# Patient Record
Sex: FEMALE | Race: WHITE | Employment: UNEMPLOYED | URBAN - METROPOLITAN AREA
[De-identification: names, ages, dates, MRNs, and addresses within clinical notes are randomized per-mention and may not be internally consistent; named-entity substitution may affect disease eponyms.]

---

## 2017-03-13 ENCOUNTER — HOSPITAL ENCOUNTER (OUTPATIENT)
Dept: OTHER | Age: 52
Discharge: HOME | End: 2017-03-13
Attending: PSYCHIATRY & NEUROLOGY | Admitting: PSYCHIATRY & NEUROLOGY

## 2017-03-13 LAB
A/G RATIO: 1.6 (ref 1.1–2.2)
ALBUMIN SERPL-MCNC: 4.2 G/DL (ref 3.4–5)
ALP BLD-CCNC: 43 U/L (ref 40–129)
ALT SERPL-CCNC: 25 U/L (ref 10–40)
ANION GAP SERPL CALCULATED.3IONS-SCNC: 13 MMOL/L (ref 3–16)
AST SERPL-CCNC: 24 U/L (ref 15–37)
BASOPHILS ABSOLUTE: 0 K/UL (ref 0–0.2)
BASOPHILS RELATIVE PERCENT: 0.7 %
BILIRUB SERPL-MCNC: <0.2 MG/DL (ref 0–1)
BUN BLDV-MCNC: 14 MG/DL (ref 7–20)
CALCIUM SERPL-MCNC: 9 MG/DL (ref 8.3–10.6)
CHLORIDE BLD-SCNC: 104 MMOL/L (ref 99–110)
CO2: 24 MMOL/L (ref 21–32)
CREAT SERPL-MCNC: 0.7 MG/DL (ref 0.6–1.1)
EOSINOPHILS ABSOLUTE: 0.1 K/UL (ref 0–0.6)
EOSINOPHILS RELATIVE PERCENT: 1 %
GFR AFRICAN AMERICAN: >60
GFR NON-AFRICAN AMERICAN: >60
GLOBULIN: 2.7 G/DL
GLUCOSE BLD-MCNC: 95 MG/DL (ref 70–99)
HCT VFR BLD CALC: 41.3 % (ref 36–48)
HEMOGLOBIN: 13.4 G/DL (ref 12–16)
LYMPHOCYTES ABSOLUTE: 2.2 K/UL (ref 1–5.1)
LYMPHOCYTES RELATIVE PERCENT: 37.5 %
MCH RBC QN AUTO: 28.5 PG (ref 26–34)
MCHC RBC AUTO-ENTMCNC: 32.3 G/DL (ref 31–36)
MCV RBC AUTO: 88.3 FL (ref 80–100)
MONOCYTES ABSOLUTE: 0.5 K/UL (ref 0–1.3)
MONOCYTES RELATIVE PERCENT: 8.5 %
NEUTROPHILS ABSOLUTE: 3.1 K/UL (ref 1.7–7.7)
NEUTROPHILS RELATIVE PERCENT: 52.3 %
PDW BLD-RTO: 13.5 % (ref 12.4–15.4)
PLATELET # BLD: 225 K/UL (ref 135–450)
PMV BLD AUTO: 9 FL (ref 5–10.5)
POTASSIUM SERPL-SCNC: 3.6 MMOL/L (ref 3.5–5.1)
RBC # BLD: 4.68 M/UL (ref 4–5.2)
SODIUM BLD-SCNC: 141 MMOL/L (ref 136–145)
TOTAL PROTEIN: 6.9 G/DL (ref 6.4–8.2)
WBC # BLD: 6 K/UL (ref 4–11)

## 2017-03-15 ENCOUNTER — HOSPITAL ENCOUNTER (OUTPATIENT)
Dept: MAMMOGRAPHY | Age: 52
Discharge: HOME | End: 2017-03-15
Attending: OBSTETRICS & GYNECOLOGY | Admitting: OBSTETRICS & GYNECOLOGY

## 2017-03-15 DIAGNOSIS — Z12.31 VISIT FOR SCREENING MAMMOGRAM: ICD-10-CM

## 2017-03-31 ENCOUNTER — OFFICE VISIT (OUTPATIENT)
Dept: CARDIOLOGY CLINIC | Age: 52
End: 2017-03-31

## 2017-03-31 VITALS
WEIGHT: 145 LBS | SYSTOLIC BLOOD PRESSURE: 110 MMHG | HEART RATE: 58 BPM | OXYGEN SATURATION: 96 % | DIASTOLIC BLOOD PRESSURE: 70 MMHG | HEIGHT: 62 IN | BODY MASS INDEX: 26.68 KG/M2

## 2017-03-31 DIAGNOSIS — R07.2 PRECORDIAL PAIN: ICD-10-CM

## 2017-03-31 PROCEDURE — 99204 OFFICE O/P NEW MOD 45 MIN: CPT | Performed by: INTERNAL MEDICINE

## 2017-03-31 RX ORDER — MORPHINE SULFATE 60 MG/1
60 CAPSULE, EXTENDED RELEASE ORAL DAILY
COMMUNITY

## 2017-03-31 RX ORDER — LEVALBUTEROL INHALATION SOLUTION 0.31 MG/3ML
1 SOLUTION RESPIRATORY (INHALATION) EVERY 4 HOURS PRN
COMMUNITY
End: 2018-01-03 | Stop reason: SDUPTHER

## 2017-03-31 RX ORDER — MORPHINE SULFATE 15 MG/1
30 TABLET, FILM COATED, EXTENDED RELEASE ORAL
Refills: 0 | COMMUNITY
Start: 2017-03-06 | End: 2018-02-28 | Stop reason: ALTCHOICE

## 2017-03-31 RX ORDER — AZITHROMYCIN 250 MG/1
250 TABLET, FILM COATED ORAL DAILY
COMMUNITY
End: 2017-06-22 | Stop reason: ALTCHOICE

## 2017-04-11 ENCOUNTER — HOSPITAL ENCOUNTER (OUTPATIENT)
Dept: CARDIOLOGY | Facility: CLINIC | Age: 52
Discharge: HOME | End: 2017-04-11
Attending: INTERNAL MEDICINE | Admitting: INTERNAL MEDICINE

## 2017-04-11 LAB
LV EF: 75 %
LVEF MODALITY: NORMAL

## 2017-04-12 ENCOUNTER — TELEPHONE (OUTPATIENT)
Dept: CARDIOLOGY CLINIC | Age: 52
End: 2017-04-12

## 2017-04-12 DIAGNOSIS — S22.42XA CLOSED FRACTURE OF MULTIPLE RIBS OF LEFT SIDE, INITIAL ENCOUNTER: Primary | ICD-10-CM

## 2017-04-21 ENCOUNTER — HOSPITAL ENCOUNTER (OUTPATIENT)
Dept: CT IMAGING | Age: 52
Discharge: HOME | End: 2017-04-21
Admitting: INTERNAL MEDICINE

## 2017-04-21 ENCOUNTER — TELEPHONE (OUTPATIENT)
Dept: CARDIOLOGY CLINIC | Age: 52
End: 2017-04-21

## 2017-04-21 DIAGNOSIS — S22.42XA CLOSED FRACTURE OF MULTIPLE RIBS OF LEFT SIDE, INITIAL ENCOUNTER: ICD-10-CM

## 2017-04-21 DIAGNOSIS — S22.42XA MULTIPLE FRACTURES OF RIBS, LEFT SIDE, INITIAL ENCOUNTER FOR CLOSED FRACTURE: ICD-10-CM

## 2017-04-21 DIAGNOSIS — R07.2 PRECORDIAL PAIN: ICD-10-CM

## 2017-04-21 LAB
CHOLESTEROL, TOTAL: 227 MG/DL (ref 0–199)
HDLC SERPL-MCNC: 86 MG/DL (ref 40–60)
LDL CHOLESTEROL CALCULATED: 109 MG/DL
TRIGL SERPL-MCNC: 158 MG/DL (ref 0–150)
TSH REFLEX FT4: 1.07 UIU/ML (ref 0.27–4.2)
VLDLC SERPL CALC-MCNC: 32 MG/DL

## 2017-04-24 ENCOUNTER — TELEPHONE (OUTPATIENT)
Dept: CARDIOLOGY CLINIC | Age: 52
End: 2017-04-24

## 2017-06-22 ENCOUNTER — HOSPITAL ENCOUNTER (OUTPATIENT)
Dept: GENERAL RADIOLOGY | Age: 52
Discharge: HOME | End: 2017-06-22

## 2017-06-22 ENCOUNTER — TELEPHONE (OUTPATIENT)
Dept: PULMONOLOGY | Age: 52
End: 2017-06-22

## 2017-06-22 ENCOUNTER — OFFICE VISIT (OUTPATIENT)
Dept: PULMONOLOGY | Age: 52
End: 2017-06-22

## 2017-06-22 VITALS
BODY MASS INDEX: 26.39 KG/M2 | TEMPERATURE: 98.2 F | WEIGHT: 143.4 LBS | OXYGEN SATURATION: 99 % | HEIGHT: 62 IN | SYSTOLIC BLOOD PRESSURE: 107 MMHG | DIASTOLIC BLOOD PRESSURE: 62 MMHG | HEART RATE: 54 BPM | RESPIRATION RATE: 16 BRPM

## 2017-06-22 DIAGNOSIS — D71 GRANULOMATOUS DISEASE (HCC): ICD-10-CM

## 2017-06-22 DIAGNOSIS — J45.50 ASTHMA, SEVERE PERSISTENT, POORLY-CONTROLLED, UNCOMPLICATED: ICD-10-CM

## 2017-06-22 DIAGNOSIS — J45.50 ASTHMA, SEVERE PERSISTENT, POORLY-CONTROLLED, UNCOMPLICATED: Primary | ICD-10-CM

## 2017-06-22 LAB
BASOPHILS ABSOLUTE: 0 K/UL (ref 0–0.2)
BASOPHILS RELATIVE PERCENT: 1.2 %
EOSINOPHILS ABSOLUTE: 0.1 K/UL (ref 0–0.6)
EOSINOPHILS RELATIVE PERCENT: 3.9 %
HCT VFR BLD CALC: 42.8 % (ref 36–48)
HEMOGLOBIN: 13.8 G/DL (ref 12–16)
LYMPHOCYTES ABSOLUTE: 1.4 K/UL (ref 1–5.1)
LYMPHOCYTES RELATIVE PERCENT: 39.9 %
MCH RBC QN AUTO: 29 PG (ref 26–34)
MCHC RBC AUTO-ENTMCNC: 32.2 G/DL (ref 31–36)
MCV RBC AUTO: 89.9 FL (ref 80–100)
MONOCYTES ABSOLUTE: 0.3 K/UL (ref 0–1.3)
MONOCYTES RELATIVE PERCENT: 8.2 %
NEUTROPHILS ABSOLUTE: 1.6 K/UL (ref 1.7–7.7)
NEUTROPHILS RELATIVE PERCENT: 46.8 %
PDW BLD-RTO: 13.4 % (ref 12.4–15.4)
PLATELET # BLD: 189 K/UL (ref 135–450)
PMV BLD AUTO: 10.3 FL (ref 5–10.5)
RBC # BLD: 4.76 M/UL (ref 4–5.2)
WBC # BLD: 3.5 K/UL (ref 4–11)

## 2017-06-22 PROCEDURE — 99204 OFFICE O/P NEW MOD 45 MIN: CPT | Performed by: INTERNAL MEDICINE

## 2017-06-22 RX ORDER — M-VIT,TX,IRON,MINS/CALC/FOLIC 27MG-0.4MG
1 TABLET ORAL DAILY
COMMUNITY

## 2017-06-22 RX ORDER — MONTELUKAST SODIUM 10 MG/1
10 TABLET ORAL NIGHTLY
COMMUNITY
End: 2020-11-15 | Stop reason: SDUPTHER

## 2017-06-22 RX ORDER — MULTIVIT WITH MINERALS/LUTEIN
1000 TABLET ORAL DAILY
COMMUNITY

## 2017-06-22 RX ORDER — OMEPRAZOLE 20 MG/1
20 CAPSULE, DELAYED RELEASE ORAL 2 TIMES DAILY
Refills: 5 | COMMUNITY
Start: 2017-05-30

## 2017-06-22 RX ORDER — CETIRIZINE HYDROCHLORIDE 10 MG/1
10 TABLET ORAL DAILY
COMMUNITY
End: 2022-06-20 | Stop reason: SDUPTHER

## 2017-06-22 ASSESSMENT — ENCOUNTER SYMPTOMS
EYE ITCHING: 0
COUGH: 1
WHEEZING: 1
EYE PAIN: 0
VOICE CHANGE: 0
DIARRHEA: 0
EYE DISCHARGE: 0
CONSTIPATION: 0
ABDOMINAL PAIN: 0
SHORTNESS OF BREATH: 1
SORE THROAT: 0

## 2017-06-23 LAB — ANGIOTENSIN CONVERTING ENZYME: 48 U/L (ref 9–67)

## 2017-06-24 LAB
ALPHA-1 ANTITRYPSIN PHENOTYPE: NORMAL
ALPHA-1 ANTITRYPSIN: 174 MG/DL (ref 90–200)

## 2017-06-25 LAB
2000687N OAK TREE IGE: <0.1 KU/L
ALLERGEN ASPERGILLUS ALTERNATA IGE: <0.1 KU/L
ALLERGEN ASPERGILLUS FUMIGATUS IGE: <0.1 KU/L
ALLERGEN BERMUDA GRASS IGE: <0.1 KU/L
ALLERGEN BIRCH IGE: <0.1 KU/L
ALLERGEN CAT DANDER IGE: <0.1 KU/L
ALLERGEN COMMON SHORT RAGWEED IGE: <0.1 KU/L
ALLERGEN COTTONWOOD: <0.1 KU/L
ALLERGEN COW MILK IGE: <0.1 KU/L
ALLERGEN DOG DANDER IGE: <0.1 KU/L
ALLERGEN ELM IGE: <0.1 KU/L
ALLERGEN FUNGI/MOLD M.RACEMOSUS IGE: <0.1 KU/L
ALLERGEN GERMAN COCKROACH IGE: <0.1 KU/L
ALLERGEN HORMODENDRUM HORDEI IGE: <0.1 KU/L
ALLERGEN MAPLE/BOX ELDER IGE: <0.1 KU/L
ALLERGEN MITE DUST FARINAE IGE: <0.1 KU/L
ALLERGEN MITE DUST PTERONYSSINUS IGE: <0.1 KU/L
ALLERGEN MOUNTAIN CEDAR: <0.1 KU/L
ALLERGEN MOUSE EPITHELIA IGE: <0.1 KU/L
ALLERGEN PEANUT (F13) IGE: <0.1 KU/L
ALLERGEN PECAN TREE IGE: <0.1 KU/L
ALLERGEN PENICILLIUM NOTATUM: <0.1 KU/L
ALLERGEN ROUGH PIGWEED (W14) IGE: <0.1 KU/L
ALLERGEN RUSSIAN THISTLE IGE: <0.1 KU/L
ALLERGEN SHEEP SORREL (W18) IGE: <0.1 KU/L
ALLERGEN TIMOTHY GRASS: <0.1 KU/L
ALLERGEN TREE SYCAMORE: <0.1 KU/L
ALLERGEN WALNUT TREE IGE: <0.1 KU/L
ALLERGEN WHITE MULBERRY TREE, IGE: <0.1 KU/L
ALLERGEN, TREE, WHITE ASH IGE: <0.1 KU/L
IGE: 10 KU/L

## 2017-06-26 LAB — ALLERGEN SEE NOTE: NORMAL

## 2017-06-27 ENCOUNTER — HOSPITAL ENCOUNTER (OUTPATIENT)
Dept: PULMONOLOGY | Age: 52
Discharge: HOME | End: 2017-06-27
Attending: INTERNAL MEDICINE | Admitting: INTERNAL MEDICINE

## 2017-06-27 ENCOUNTER — OFFICE VISIT (OUTPATIENT)
Dept: PULMONOLOGY | Age: 52
End: 2017-06-27

## 2017-06-27 VITALS
RESPIRATION RATE: 16 BRPM | TEMPERATURE: 97.5 F | BODY MASS INDEX: 22.51 KG/M2 | OXYGEN SATURATION: 98 % | HEIGHT: 67 IN | DIASTOLIC BLOOD PRESSURE: 60 MMHG | WEIGHT: 143.4 LBS | HEART RATE: 62 BPM | SYSTOLIC BLOOD PRESSURE: 109 MMHG

## 2017-06-27 DIAGNOSIS — J45.50 ASTHMA, SEVERE PERSISTENT, WELL-CONTROLLED: Primary | ICD-10-CM

## 2017-06-27 DIAGNOSIS — J45.50 ASTHMA, SEVERE PERSISTENT, POORLY-CONTROLLED, UNCOMPLICATED: ICD-10-CM

## 2017-06-27 DIAGNOSIS — J45.50 SEVERE PERSISTENT ASTHMA, UNCOMPLICATED: ICD-10-CM

## 2017-06-27 DIAGNOSIS — D71 GRANULOMATOUS DISEASE (HCC): ICD-10-CM

## 2017-06-27 PROCEDURE — 99213 OFFICE O/P EST LOW 20 MIN: CPT | Performed by: INTERNAL MEDICINE

## 2017-06-27 RX ORDER — LEVALBUTEROL TARTRATE 45 UG/1
4 AEROSOL, METERED ORAL ONCE
Status: COMPLETED | OUTPATIENT
Start: 2017-06-27 | End: 2017-06-27

## 2017-06-27 RX ADMIN — LEVALBUTEROL TARTRATE 4 PUFF: 45 AEROSOL, METERED ORAL at 11:06

## 2017-06-27 ASSESSMENT — ENCOUNTER SYMPTOMS
SORE THROAT: 0
EYE DISCHARGE: 0
CONSTIPATION: 0
SHORTNESS OF BREATH: 1
COUGH: 0
EYE ITCHING: 0
EYE PAIN: 0
DIARRHEA: 0
ABDOMINAL PAIN: 0
CHOKING: 0
VOICE CHANGE: 0

## 2017-06-29 ENCOUNTER — TELEPHONE (OUTPATIENT)
Dept: PULMONOLOGY | Age: 52
End: 2017-06-29

## 2017-07-03 ENCOUNTER — HOSPITAL ENCOUNTER (OUTPATIENT)
Dept: OTHER | Age: 52
Discharge: HOME | End: 2017-07-03
Attending: INTERNAL MEDICINE | Admitting: INTERNAL MEDICINE

## 2017-07-03 ENCOUNTER — OFFICE VISIT (OUTPATIENT)
Dept: PULMONOLOGY | Age: 52
End: 2017-07-03

## 2017-07-03 VITALS
RESPIRATION RATE: 16 BRPM | SYSTOLIC BLOOD PRESSURE: 103 MMHG | HEART RATE: 67 BPM | DIASTOLIC BLOOD PRESSURE: 64 MMHG | OXYGEN SATURATION: 99 % | WEIGHT: 142.2 LBS | BODY MASS INDEX: 22.32 KG/M2 | TEMPERATURE: 98.2 F | HEIGHT: 67 IN

## 2017-07-03 DIAGNOSIS — J45.50 UNCOMPLICATED SEVERE PERSISTENT ASTHMA: ICD-10-CM

## 2017-07-03 DIAGNOSIS — R07.89 OTHER CHEST PAIN: ICD-10-CM

## 2017-07-03 DIAGNOSIS — R07.89 OTHER CHEST PAIN: Primary | ICD-10-CM

## 2017-07-03 PROCEDURE — 99214 OFFICE O/P EST MOD 30 MIN: CPT | Performed by: INTERNAL MEDICINE

## 2017-07-03 RX ORDER — PREDNISONE 20 MG/1
60 TABLET ORAL DAILY
Qty: 15 TABLET | Refills: 0 | Status: SHIPPED | OUTPATIENT
Start: 2017-07-03 | End: 2017-07-08

## 2017-07-03 RX ORDER — BUDESONIDE AND FORMOTEROL FUMARATE DIHYDRATE 160; 4.5 UG/1; UG/1
2 AEROSOL RESPIRATORY (INHALATION) 2 TIMES DAILY
Qty: 1 INHALER | Refills: 3 | Status: SHIPPED | OUTPATIENT
Start: 2017-07-03 | End: 2017-09-27 | Stop reason: ALTCHOICE

## 2017-07-03 ASSESSMENT — ENCOUNTER SYMPTOMS
COUGH: 0
EYE PAIN: 0
SHORTNESS OF BREATH: 1
EYE ITCHING: 0
ABDOMINAL PAIN: 0
CONSTIPATION: 0
SORE THROAT: 0
DIARRHEA: 0
CHEST TIGHTNESS: 1
VOICE CHANGE: 0
EYE DISCHARGE: 0

## 2017-07-07 ENCOUNTER — TELEPHONE (OUTPATIENT)
Dept: PULMONOLOGY | Age: 52
End: 2017-07-07

## 2017-07-07 DIAGNOSIS — R07.89 OTHER CHEST PAIN: Primary | ICD-10-CM

## 2017-07-20 ENCOUNTER — HOSPITAL ENCOUNTER (OUTPATIENT)
Dept: CT IMAGING | Age: 52
Discharge: HOME | End: 2017-07-20
Attending: INTERNAL MEDICINE | Admitting: INTERNAL MEDICINE

## 2017-07-20 DIAGNOSIS — R07.89 OTHER CHEST PAIN: ICD-10-CM

## 2017-09-27 ENCOUNTER — OFFICE VISIT (OUTPATIENT)
Dept: PULMONOLOGY | Age: 52
End: 2017-09-27

## 2017-09-27 VITALS
TEMPERATURE: 98.1 F | HEART RATE: 63 BPM | DIASTOLIC BLOOD PRESSURE: 69 MMHG | RESPIRATION RATE: 16 BRPM | OXYGEN SATURATION: 97 % | WEIGHT: 148.4 LBS | HEIGHT: 67 IN | SYSTOLIC BLOOD PRESSURE: 107 MMHG | BODY MASS INDEX: 23.29 KG/M2

## 2017-09-27 DIAGNOSIS — J44.9 COPD WITH ASTHMA (HCC): ICD-10-CM

## 2017-09-27 DIAGNOSIS — G47.10 EXCESSIVE SOMNOLENCE DISORDER: Primary | ICD-10-CM

## 2017-09-27 PROCEDURE — 99214 OFFICE O/P EST MOD 30 MIN: CPT | Performed by: INTERNAL MEDICINE

## 2017-09-27 ASSESSMENT — SLEEP AND FATIGUE QUESTIONNAIRES
ESS TOTAL SCORE: 13
HOW LIKELY ARE YOU TO NOD OFF OR FALL ASLEEP WHILE SITTING QUIETLY AFTER LUNCH WITHOUT ALCOHOL: 3
NECK CIRCUMFERENCE (INCHES): 12.5
HOW LIKELY ARE YOU TO NOD OFF OR FALL ASLEEP WHILE LYING DOWN TO REST IN THE AFTERNOON WHEN CIRCUMSTANCES PERMIT: 3
HOW LIKELY ARE YOU TO NOD OFF OR FALL ASLEEP WHILE SITTING INACTIVE IN A PUBLIC PLACE: 1
HOW LIKELY ARE YOU TO NOD OFF OR FALL ASLEEP WHILE WATCHING TV: 1
HOW LIKELY ARE YOU TO NOD OFF OR FALL ASLEEP WHILE SITTING AND TALKING TO SOMEONE: 0
HOW LIKELY ARE YOU TO NOD OFF OR FALL ASLEEP IN A CAR, WHILE STOPPED FOR A FEW MINUTES IN TRAFFIC: 0
HOW LIKELY ARE YOU TO NOD OFF OR FALL ASLEEP WHILE SITTING AND READING: 3
HOW LIKELY ARE YOU TO NOD OFF OR FALL ASLEEP WHEN YOU ARE A PASSENGER IN A CAR FOR AN HOUR WITHOUT A BREAK: 2

## 2017-09-27 ASSESSMENT — ENCOUNTER SYMPTOMS
WHEEZING: 0
EYE ITCHING: 0
ABDOMINAL PAIN: 0
VOICE CHANGE: 0
EYE DISCHARGE: 0
EYE PAIN: 0
COUGH: 1
CONSTIPATION: 0
SORE THROAT: 0
DIARRHEA: 0
SHORTNESS OF BREATH: 0

## 2017-10-24 ENCOUNTER — OFFICE VISIT (OUTPATIENT)
Dept: PULMONOLOGY | Age: 52
End: 2017-10-24

## 2017-10-24 VITALS
HEART RATE: 62 BPM | TEMPERATURE: 97.8 F | WEIGHT: 151 LBS | OXYGEN SATURATION: 97 % | BODY MASS INDEX: 23.7 KG/M2 | HEIGHT: 67 IN | DIASTOLIC BLOOD PRESSURE: 64 MMHG | SYSTOLIC BLOOD PRESSURE: 106 MMHG | RESPIRATION RATE: 16 BRPM

## 2017-10-24 DIAGNOSIS — Z87.891 FORMER SMOKER: ICD-10-CM

## 2017-10-24 DIAGNOSIS — G47.33 OSA (OBSTRUCTIVE SLEEP APNEA): ICD-10-CM

## 2017-10-24 DIAGNOSIS — J45.21 MILD INTERMITTENT ASTHMA WITH ACUTE EXACERBATION: Primary | ICD-10-CM

## 2017-10-24 DIAGNOSIS — J43.8 PARASEPTAL EMPHYSEMA (HCC): ICD-10-CM

## 2017-10-24 DIAGNOSIS — B37.0 ORAL THRUSH: ICD-10-CM

## 2017-10-24 DIAGNOSIS — J20.9 ACUTE BRONCHITIS, UNSPECIFIED ORGANISM: ICD-10-CM

## 2017-10-24 DIAGNOSIS — J43.9 BULLA OF LUNG (HCC): ICD-10-CM

## 2017-10-24 PROCEDURE — G8427 DOCREV CUR MEDS BY ELIG CLIN: HCPCS | Performed by: INTERNAL MEDICINE

## 2017-10-24 PROCEDURE — 1036F TOBACCO NON-USER: CPT | Performed by: INTERNAL MEDICINE

## 2017-10-24 PROCEDURE — 99213 OFFICE O/P EST LOW 20 MIN: CPT | Performed by: INTERNAL MEDICINE

## 2017-10-24 PROCEDURE — G8926 SPIRO NO PERF OR DOC: HCPCS | Performed by: INTERNAL MEDICINE

## 2017-10-24 PROCEDURE — 3014F SCREEN MAMMO DOC REV: CPT | Performed by: INTERNAL MEDICINE

## 2017-10-24 PROCEDURE — 3017F COLORECTAL CA SCREEN DOC REV: CPT | Performed by: INTERNAL MEDICINE

## 2017-10-24 PROCEDURE — 3023F SPIROM DOC REV: CPT | Performed by: INTERNAL MEDICINE

## 2017-10-24 PROCEDURE — G8420 CALC BMI NORM PARAMETERS: HCPCS | Performed by: INTERNAL MEDICINE

## 2017-10-24 PROCEDURE — G8484 FLU IMMUNIZE NO ADMIN: HCPCS | Performed by: INTERNAL MEDICINE

## 2017-10-24 RX ORDER — AZITHROMYCIN 250 MG/1
TABLET, FILM COATED ORAL
Qty: 1 PACKET | Refills: 0 | Status: SHIPPED | OUTPATIENT
Start: 2017-10-24 | End: 2017-11-03

## 2017-10-24 RX ORDER — PREDNISONE 10 MG/1
10 TABLET ORAL DAILY
Qty: 15 TABLET | Refills: 0 | Status: SHIPPED | OUTPATIENT
Start: 2017-10-24 | End: 2017-10-29

## 2017-10-24 RX ORDER — FLUCONAZOLE 100 MG/1
100 TABLET ORAL DAILY
Qty: 5 TABLET | Refills: 5 | Status: SHIPPED | OUTPATIENT
Start: 2017-10-24 | End: 2017-10-29

## 2017-10-24 ASSESSMENT — ENCOUNTER SYMPTOMS
WHEEZING: 1
SINUS PRESSURE: 1
SHORTNESS OF BREATH: 1
SORE THROAT: 1
RHINORRHEA: 1
VOICE CHANGE: 1

## 2017-10-24 NOTE — LETTER
[address] [facility]  [address] [place] [address]  Phone: [phone]  Fax: [phone]    Moises Hidalgo MD        October 24, 2017       Patient: Venita Weber   MR Number: [id number]   YOB: 1965   Date of Visit: 10/24/2017       Dear Dr. Roxana Alvarado:    Mary Otero was seen today for Dr. Joann Rowell. She has an asthma exacerbation with acute bronchitis. Below are the relevant portions of my assessment and plan of care. She will follow up with Dr. Joann Rowell on 11/28/17. If you have questions, please do not hesitate to call me. I look forward to following Mildred Gutierrez along with you.     Sincerely,        Duong Larson MD    CC providers:  Katy Coughlin MD  [address] [place] [address] Drive: [phone]

## 2017-10-24 NOTE — PROGRESS NOTES
Pulmonary Outpatient Note   Diana Nugent MD       10/24/2017    1. Mild intermittent asthma with acute exacerbation    2. Acute bronchitis, unspecified organism    3. Oral thrush    4. Bulla of lung ([address]    5. Paraseptal emphysema ([address]    6. VADIM (obstructive sleep apnea)    7. Former smoker          ASSESSMENT/PLAN:  1. Mild intermittent asthma with acute exacerbation. Continue Dulera, she was given a 5 day burst of prednisone 30 mg daily. 2.  Acute bronchitis. Typically patients with asthma did not need an antibiotic for exacerbation, but she continues to have yellowish phlegm and is not improved. I will give her Z-Jeffrey. She has had \"severe allergy\" to sulfa as a child, has had significant skin rashes with penicillin. 3.  Oral thrush. The areas too far back for nystatin to work, she was given 5 days of Diflucan. She was told to use her spacer with her Sultan. 4.  Bullous lung, paraseptal emphysema. The lesion in the left lower lobe is certainly a bulla, as it has clearly defined walls, is unchanged from 2 scans. I do not believe this is an area of air trapping. Images were shown to her. She also has a few blebs at the apex, minimal paraseptal emphysema. She has quit smoking, does not have any secondhand smoke exposure. Alpha-1 antitrypsin level was normal.  I told her the bulla and is to be left alone unless it enlarges and compresses the left lung. 5.  VADIM. A sleep study and follow-up has been scheduled by Dr. Munira Rob. 6.  Former smoker. No lung nodules on recent CT. Consider bone imaging for osteoporosis if clinically indicated. 7.  Prophylaxis. Has received a flu shot. [facility] 11/28/17 for follow-up with Dr. Munira Rob. Call next week if not improved. No orders of the defined types were placed in this encounter. Return in about 5 weeks (around 11/28/2017).       Chief Complaint:  Illness (chest congestion x 1 week)       HPI:   47y.o. year old female here for increasing chest congestion. She is a prior smoker with bronchial asthma. Her PCP is Cristela Phipps. Ngoc Giraldo is a 79-year-old female being seen for my partner Dr. Amalia Davis. She was last seen by him on 9/27/17. She is being followed for asthma, was well controlled on Dulera and when necessary Xopenex. At that visit she had described snoring, strong family history and had daytime somnolence. She had been asked to continue her bronchodilator regimen, and was asked to get evaluated for VADIM. About 1-1.5 weeks ago, she developed an upper respiratory tract infection involving her upper airway, and simultaneously had chest infection. She had cough with phlegm that is dark yellowish. She denied hemoptysis. She was feverish, but did not have a thermometer, when she did buy a thermometer, her fever was gone. She had lost her voice, and this is slightly improved, she missed one day of work last week. Her boyfriend got sick after her, there was no recent travel or other sick contacts. Her boyfriend does not smoke. She also had increased dyspnea, and was using her rescue inhaler very frequently, up to 3 times a day. She has a spacer that she uses on her albuterol, but not on Dulera. She was compliant with the [facility]. She denied GI or  complaints. The patient gives a history of smoking up to 1.5 pack per day, but for less than 10 years intermittently. She has not smoked for more than 15 years. I reviewed her CT chest and her PFT. Her last PFT on 6/27/17 showed FEV1 of 1.34 L, 44% predicted, with a good bronchodilator response. TLC was reduced at 77% predicted, there was evidence of air trapping. There was a significant decline in her TLC from 6/29/10.     Past Medical History:   Diagnosis Date    Asthma     Back pain     Headache     Neck pain        Past Surgical History:   Procedure Laterality Date    FOOT SURGERY      right    HIP SURGERY      HYSTERECTOMY      NECK SURGERY      NECK SURGERY Social History   Substance Use Topics    Smoking status: Former Smoker     Packs/day: 0.50     Years: 10.00     Types: Cigarettes     Quit date: 1/1/2007    Smokeless tobacco: Never Used    Alcohol use Not on file       Family History   Problem Relation Age of Onset    High Blood Pressure Father     Heart Disease Father      aorta ruptured    Heart Disease Maternal Grandmother      pacemaker         Current Outpatient Prescriptions:     Chlorphen-Phenyleph-ASA (KATARINA-SELTZER PLUS COLD PO), Take by mouth as needed, Disp: , Rfl:     predniSONE (DELTASONE) 10 MG tablet, Take 1 tablet by mouth daily for 5 days, Disp: 15 tablet, Rfl: 0    azithromycin (ZITHROMAX) 250 MG tablet, 2 on day 1, then 1 daily until gone, Disp: 1 packet, Rfl: 0    fluconazole (DIFLUCAN) 100 MG tablet, Take 1 tablet by mouth daily for 5 days, Disp: 5 tablet, Rfl: 5    mometasone-formoterol (DULERA) 200-5 MCG/ACT inhaler, Inhale 2 puffs into the lungs every 12 hours, Disp: 1 Inhaler, Rfl: 11    omeprazole (PRILOSEC) 20 MG delayed release capsule, , Disp: , Rfl: 5    vitamin B-2 (RIBOFLAVIN) 100 MG TABS tablet, , Disp: , Rfl: 3    cetirizine (ZYRTEC) 10 MG tablet, Take 10 mg by mouth daily, Disp: , Rfl:     naloxegol (MOVANTIK) 25 MG TABS tablet, Take 25 mg by mouth every morning, Disp: , Rfl:     Multiple Vitamins-Minerals (THERAPEUTIC MULTIVITAMIN-MINERALS) tablet, Take 1 tablet by mouth daily, Disp: , Rfl:     Ascorbic Acid (VITAMIN C) 250 MG tablet, Take 1,000 mg by mouth daily, Disp: , Rfl:     estrogens, conjugated, (PREMARIN) 1.25 MG tablet, Take by mouth, Disp: , Rfl:     morphine (AVINZA) 60 MG extended release capsule, Take 60 mg by mouth ., Disp: , Rfl:     morphine (MS CONTIN) 15 MG extended release tablet, 30 mg  ., Disp: , Rfl: 0    Levalbuterol HCl (XOPENEX IN), Inhale into the lungs, Disp: , Rfl:     levalbuterol (XOPENEX) 0.31 MG/3ML nebulization, Take 1 ampule by nebulization every 4 hours as needed for Wheezing, Disp: , Rfl:     SUMAtriptan (IMITREX) 100 MG tablet, Take 200 mg by mouth once as needed. , Disp: , Rfl:     SUMAtriptan (IMITREX) 5 MG/ACT nasal spray, 1 spray by Nasal route daily as needed. , Disp: , Rfl:     quetiapine (SEROQUEL) 100 MG tablet, Take 50 mg by mouth nightly , Disp: , Rfl:     topiramate (TOPAMAX) 100 MG tablet, Take 200 mg by mouth daily. , Disp: , Rfl:     montelukast (SINGULAIR) 10 MG tablet, Take 10 mg by mouth nightly, Disp: , Rfl:     ibuprofen (IBU) 600 MG tablet, Take 1 tablet by mouth every 6 hours as needed for Pain. Take with food. (Patient taking differently: Take 800 mg by mouth every 6 hours as needed for Pain Take with food.), Disp: 20 tablet, Rfl: 0    Latex; Acyclovir; Codeine; Eggs [egg white]; Penicillins; and Sulfa antibiotics    Vitals:    10/24/17 1404   BP: 106/64   Site: Left Arm   Position: Sitting   Cuff Size: Medium Adult   Pulse: 62   Resp: 16   Temp: 97.8 °F (36.6 °C)   TempSrc: Oral   SpO2: 97%   Weight: 151 lb (68.5 kg)   Height: 5' 7\" (1.702 m)       Review of Systems   HENT: Positive for congestion, postnasal drip, rhinorrhea, sinus pressure, sore throat and voice change. Respiratory: Positive for shortness of breath and wheezing. Psychiatric/Behavioral: Positive for sleep disturbance. All other systems reviewed and are negative. Physical Exam   Constitutional: She is oriented to person, place, and time. She appears well-developed and well-nourished. No distress. Thin built, ill appearing, congested sounding voice   HENT:   Head: Normocephalic and atraumatic. Nose: Nose normal.   Mouth/Throat: No oropharyngeal exudate. Class II airway, thrush in both tonsillar pillars, above the tonsils   Eyes: Conjunctivae are normal. Pupils are equal, round, and reactive to light. Right eye exhibits no discharge. Left eye exhibits discharge. No scleral icterus. Neck: No JVD present. No tracheal deviation present.    Cardiovascular: Normal rate, regular rhythm and normal heart sounds. Exam reveals no gallop. No murmur heard. Pulmonary/Chest: Effort normal. No stridor. No respiratory distress. She has wheezes (Few scattered). She has no rales. Neurological: She is alert and oriented to person, place, and time. Skin: Skin is warm and dry. No rash noted. She is not diaphoretic. No erythema. No pallor. Psychiatric: She has a normal mood and affect. Her behavior is normal.   Vitals reviewed.

## 2017-10-31 ENCOUNTER — HOSPITAL ENCOUNTER (OUTPATIENT)
Dept: GENERAL RADIOLOGY | Age: 52
Discharge: HOME | End: 2017-10-31
Attending: INTERNAL MEDICINE | Admitting: INTERNAL MEDICINE

## 2017-10-31 ENCOUNTER — OFFICE VISIT (OUTPATIENT)
Dept: PULMONOLOGY | Age: 52
End: 2017-10-31

## 2017-10-31 VITALS
TEMPERATURE: 97.8 F | HEART RATE: 63 BPM | DIASTOLIC BLOOD PRESSURE: 65 MMHG | HEIGHT: 67 IN | WEIGHT: 147 LBS | RESPIRATION RATE: 16 BRPM | SYSTOLIC BLOOD PRESSURE: 114 MMHG | OXYGEN SATURATION: 98 % | BODY MASS INDEX: 23.07 KG/M2

## 2017-10-31 DIAGNOSIS — J01.90 ACUTE SINUSITIS WITH SYMPTOMS GREATER THAN 10 DAYS: ICD-10-CM

## 2017-10-31 DIAGNOSIS — J22 ACUTE RESPIRATORY INFECTION: ICD-10-CM

## 2017-10-31 DIAGNOSIS — J22 ACUTE RESPIRATORY INFECTION: Primary | ICD-10-CM

## 2017-10-31 DIAGNOSIS — J45.51 ALLERGIC ASTHMA, SEVERE PERSISTENT, WITH ACUTE EXACERBATION: ICD-10-CM

## 2017-10-31 PROCEDURE — 3017F COLORECTAL CA SCREEN DOC REV: CPT | Performed by: INTERNAL MEDICINE

## 2017-10-31 PROCEDURE — 1036F TOBACCO NON-USER: CPT | Performed by: INTERNAL MEDICINE

## 2017-10-31 PROCEDURE — 99214 OFFICE O/P EST MOD 30 MIN: CPT | Performed by: INTERNAL MEDICINE

## 2017-10-31 PROCEDURE — G8427 DOCREV CUR MEDS BY ELIG CLIN: HCPCS | Performed by: INTERNAL MEDICINE

## 2017-10-31 PROCEDURE — G8420 CALC BMI NORM PARAMETERS: HCPCS | Performed by: INTERNAL MEDICINE

## 2017-10-31 PROCEDURE — G8484 FLU IMMUNIZE NO ADMIN: HCPCS | Performed by: INTERNAL MEDICINE

## 2017-10-31 PROCEDURE — 3014F SCREEN MAMMO DOC REV: CPT | Performed by: INTERNAL MEDICINE

## 2017-10-31 RX ORDER — DOXYCYCLINE HYCLATE 100 MG
100 TABLET ORAL 2 TIMES DAILY
Qty: 20 TABLET | Refills: 0 | Status: SHIPPED | OUTPATIENT
Start: 2017-10-31 | End: 2017-11-10

## 2017-10-31 ASSESSMENT — ENCOUNTER SYMPTOMS
COUGH: 1
SINUS PRESSURE: 1
SINUS PAIN: 1
ABDOMINAL PAIN: 0
EYE ITCHING: 0
SORE THROAT: 1
VOICE CHANGE: 0
EYE PAIN: 0
DIARRHEA: 0
CONSTIPATION: 0
EYE DISCHARGE: 0
WHEEZING: 0
SHORTNESS OF BREATH: 0

## 2017-10-31 NOTE — PROGRESS NOTES
Pulmonary Outpatient Note   Radha Walton MD       10/31/2017    Chief Complaint:  Cough; Chest Congestion; and Ear Fullness     HPI:   47y.o. year old female here for further evaluation of ongoing congestion symptoms. For over the last 2 weeks she was experiencing congestion in her sinuses and chest, shortness of breath, and yellow sputum. Seen last week and started on steroids as well as low dose azithromycin, she had improvement in her breathing and chest symptoms but ongoing sputum production, sinus symptoms including a sinus headache. Works as a  and has constant sick contact exposure. Developed a thrush infection from [facility], which was treated, she now is using this with a spacer. Used intranasal corticosteroids in the past but stopped due to taste in her throat.      Past Medical History:   Diagnosis Date    Asthma     Back pain     Headache     Neck pain        Past Surgical History:   Procedure Laterality Date    FOOT SURGERY      right    HIP SURGERY      HYSTERECTOMY      NECK SURGERY      NECK SURGERY         Social History   Substance Use Topics    Smoking status: Former Smoker     Packs/day: 0.50     Years: 10.00     Types: Cigarettes     Quit date: 1/1/2007    Smokeless tobacco: Never Used    Alcohol use Not on file       Family History   Problem Relation Age of Onset    High Blood Pressure Father     Heart Disease Father      aorta ruptured    Heart Disease Maternal Grandmother      pacemaker         Current Outpatient Prescriptions:     doxycycline hyclate (VIBRA-TABS) 100 MG tablet, Take 1 tablet by mouth 2 times daily for 10 days, Disp: 20 tablet, Rfl: 0    Chlorphen-Phenyleph-ASA (KATARINA-SELTZER PLUS COLD PO), Take by mouth as needed, Disp: , Rfl:     azithromycin (ZITHROMAX) 250 MG tablet, 2 on day 1, then 1 daily until gone, Disp: 1 packet, Rfl: 0    mometasone-formoterol (DULERA) 200-5 MCG/ACT inhaler, Inhale 2 puffs into the lungs every 12 hours, Disp: 1 Inhaler, Rfl: 11    omeprazole (PRILOSEC) 20 MG delayed release capsule, , Disp: , Rfl: 5    vitamin B-2 (RIBOFLAVIN) 100 MG TABS tablet, , Disp: , Rfl: 3    montelukast (SINGULAIR) 10 MG tablet, Take 10 mg by mouth nightly, Disp: , Rfl:     cetirizine (ZYRTEC) 10 MG tablet, Take 10 mg by mouth daily, Disp: , Rfl:     naloxegol (MOVANTIK) 25 MG TABS tablet, Take 25 mg by mouth every morning, Disp: , Rfl:     Multiple Vitamins-Minerals (THERAPEUTIC MULTIVITAMIN-MINERALS) tablet, Take 1 tablet by mouth daily, Disp: , Rfl:     Ascorbic Acid (VITAMIN C) 250 MG tablet, Take 1,000 mg by mouth daily, Disp: , Rfl:     estrogens, conjugated, (PREMARIN) 1.25 MG tablet, Take by mouth, Disp: , Rfl:     morphine (AVINZA) 60 MG extended release capsule, Take 60 mg by mouth ., Disp: , Rfl:     morphine (MS CONTIN) 15 MG extended release tablet, 30 mg  ., Disp: , Rfl: 0    Levalbuterol HCl (XOPENEX IN), Inhale into the lungs, Disp: , Rfl:     levalbuterol (XOPENEX) 0.31 MG/3ML nebulization, Take 1 ampule by nebulization every 4 hours as needed for Wheezing, Disp: , Rfl:     SUMAtriptan (IMITREX) 100 MG tablet, Take 200 mg by mouth once as needed. , Disp: , Rfl:     SUMAtriptan (IMITREX) 5 MG/ACT nasal spray, 1 spray by Nasal route daily as needed. , Disp: , Rfl:     quetiapine (SEROQUEL) 100 MG tablet, Take 50 mg by mouth nightly , Disp: , Rfl:     topiramate (TOPAMAX) 100 MG tablet, Take 200 mg by mouth daily. , Disp: , Rfl:     ibuprofen (IBU) 600 MG tablet, Take 1 tablet by mouth every 6 hours as needed for Pain. Take with food. (Patient taking differently: Take 800 mg by mouth every 6 hours as needed for Pain Take with food.), Disp: 20 tablet, Rfl: 0    Latex; Acyclovir; Codeine; Eggs [egg white];  Penicillins; and Sulfa antibiotics    Vitals:    10/31/17 0945   BP: 114/65   Pulse: 63   Resp: 16   Temp: 97.8 °F (36.6 °C)   TempSrc: Oral   SpO2: 98%   Weight: 147 lb (66.7 kg)   Height: 5' 7\" (1.702 m)

## 2017-11-03 LAB
CULTURE, RESPIRATORY: ABNORMAL
CULTURE, RESPIRATORY: ABNORMAL
GRAM STAIN RESULT: ABNORMAL
ORGANISM: ABNORMAL

## 2017-11-17 ENCOUNTER — HOSPITAL ENCOUNTER (OUTPATIENT)
Dept: SLEEP MEDICINE | Age: 52
Discharge: HOME | End: 2017-11-18
Attending: INTERNAL MEDICINE | Admitting: INTERNAL MEDICINE

## 2017-11-17 DIAGNOSIS — G47.10 EXCESSIVE SOMNOLENCE DISORDER: ICD-10-CM

## 2017-11-28 ENCOUNTER — OFFICE VISIT (OUTPATIENT)
Dept: PULMONOLOGY | Age: 52
End: 2017-11-28

## 2017-11-28 ENCOUNTER — HOSPITAL ENCOUNTER (OUTPATIENT)
Dept: OTHER | Age: 52
Discharge: HOME | End: 2017-11-28
Attending: INTERNAL MEDICINE | Admitting: INTERNAL MEDICINE

## 2017-11-28 VITALS
SYSTOLIC BLOOD PRESSURE: 107 MMHG | TEMPERATURE: 98.3 F | HEART RATE: 65 BPM | HEIGHT: 67 IN | OXYGEN SATURATION: 97 % | DIASTOLIC BLOOD PRESSURE: 59 MMHG | RESPIRATION RATE: 16 BRPM

## 2017-11-28 DIAGNOSIS — R07.89 OTHER CHEST PAIN: ICD-10-CM

## 2017-11-28 DIAGNOSIS — R07.89 OTHER CHEST PAIN: Primary | ICD-10-CM

## 2017-11-28 PROCEDURE — G8420 CALC BMI NORM PARAMETERS: HCPCS | Performed by: INTERNAL MEDICINE

## 2017-11-28 PROCEDURE — G8484 FLU IMMUNIZE NO ADMIN: HCPCS | Performed by: INTERNAL MEDICINE

## 2017-11-28 PROCEDURE — 3017F COLORECTAL CA SCREEN DOC REV: CPT | Performed by: INTERNAL MEDICINE

## 2017-11-28 PROCEDURE — 99213 OFFICE O/P EST LOW 20 MIN: CPT | Performed by: INTERNAL MEDICINE

## 2017-11-28 PROCEDURE — 1036F TOBACCO NON-USER: CPT | Performed by: INTERNAL MEDICINE

## 2017-11-28 PROCEDURE — G8427 DOCREV CUR MEDS BY ELIG CLIN: HCPCS | Performed by: INTERNAL MEDICINE

## 2017-11-28 PROCEDURE — 3014F SCREEN MAMMO DOC REV: CPT | Performed by: INTERNAL MEDICINE

## 2017-11-28 RX ORDER — PREDNISONE 20 MG/1
40 TABLET ORAL DAILY
Qty: 14 TABLET | Refills: 0 | Status: SHIPPED | OUTPATIENT
Start: 2017-11-28 | End: 2017-12-05

## 2017-11-28 ASSESSMENT — ENCOUNTER SYMPTOMS
EYE ITCHING: 0
COUGH: 0
CONSTIPATION: 0
WHEEZING: 0
SINUS PRESSURE: 0
ABDOMINAL PAIN: 0
VOICE CHANGE: 0
SHORTNESS OF BREATH: 0
SORE THROAT: 0
EYE DISCHARGE: 0
DIARRHEA: 0
SINUS PAIN: 0
EYE PAIN: 0

## 2017-12-01 ENCOUNTER — TELEPHONE (OUTPATIENT)
Dept: PULMONOLOGY | Age: 52
End: 2017-12-01

## 2017-12-01 RX ORDER — FLUCONAZOLE 100 MG/1
100 TABLET ORAL DAILY
Qty: 7 TABLET | Refills: 0 | Status: SHIPPED | OUTPATIENT
Start: 2017-12-01 | End: 2017-12-08

## 2017-12-04 ENCOUNTER — TELEPHONE (OUTPATIENT)
Dept: PULMONOLOGY | Age: 52
End: 2017-12-04

## 2017-12-14 ENCOUNTER — OFFICE VISIT (OUTPATIENT)
Dept: PULMONOLOGY | Age: 52
End: 2017-12-14

## 2017-12-14 ENCOUNTER — HOSPITAL ENCOUNTER (OUTPATIENT)
Dept: OTHER | Age: 52
Discharge: HOME | End: 2017-12-31
Attending: INTERNAL MEDICINE | Admitting: INTERNAL MEDICINE

## 2017-12-14 VITALS
HEART RATE: 77 BPM | SYSTOLIC BLOOD PRESSURE: 118 MMHG | WEIGHT: 151 LBS | RESPIRATION RATE: 16 BRPM | HEIGHT: 68 IN | DIASTOLIC BLOOD PRESSURE: 66 MMHG | BODY MASS INDEX: 22.88 KG/M2 | OXYGEN SATURATION: 98 % | TEMPERATURE: 97.8 F

## 2017-12-14 DIAGNOSIS — J22 ACUTE RESPIRATORY INFECTION: Primary | ICD-10-CM

## 2017-12-14 DIAGNOSIS — J22 ACUTE RESPIRATORY INFECTION: ICD-10-CM

## 2017-12-14 DIAGNOSIS — J45.51 SEVERE PERSISTENT ASTHMA WITH ACUTE EXACERBATION: ICD-10-CM

## 2017-12-14 LAB
IGA: 192 MG/DL (ref 70–400)
IGG: 984 MG/DL (ref 700–1600)
IGM: 83 MG/DL (ref 40–230)

## 2017-12-14 PROCEDURE — 3017F COLORECTAL CA SCREEN DOC REV: CPT | Performed by: INTERNAL MEDICINE

## 2017-12-14 PROCEDURE — G8484 FLU IMMUNIZE NO ADMIN: HCPCS | Performed by: INTERNAL MEDICINE

## 2017-12-14 PROCEDURE — G8420 CALC BMI NORM PARAMETERS: HCPCS | Performed by: INTERNAL MEDICINE

## 2017-12-14 PROCEDURE — 3014F SCREEN MAMMO DOC REV: CPT | Performed by: INTERNAL MEDICINE

## 2017-12-14 PROCEDURE — G8427 DOCREV CUR MEDS BY ELIG CLIN: HCPCS | Performed by: INTERNAL MEDICINE

## 2017-12-14 PROCEDURE — 1036F TOBACCO NON-USER: CPT | Performed by: INTERNAL MEDICINE

## 2017-12-14 PROCEDURE — 99214 OFFICE O/P EST MOD 30 MIN: CPT | Performed by: INTERNAL MEDICINE

## 2017-12-14 RX ORDER — PREDNISONE 20 MG/1
40 TABLET ORAL DAILY
Qty: 10 TABLET | Refills: 0 | Status: SHIPPED | OUTPATIENT
Start: 2017-12-14 | End: 2017-12-19

## 2017-12-14 RX ORDER — LEVOFLOXACIN 500 MG/1
500 TABLET, FILM COATED ORAL DAILY
Qty: 7 TABLET | Refills: 0 | Status: SHIPPED | OUTPATIENT
Start: 2017-12-14 | End: 2017-12-21

## 2017-12-14 ASSESSMENT — ENCOUNTER SYMPTOMS
VOICE CHANGE: 0
DIARRHEA: 0
SINUS PAIN: 0
EYE PAIN: 0
EYE ITCHING: 0
EYE DISCHARGE: 0
SHORTNESS OF BREATH: 1
CONSTIPATION: 0
COUGH: 1
ABDOMINAL PAIN: 0
SINUS PRESSURE: 0
SORE THROAT: 0
WHEEZING: 0

## 2017-12-15 NOTE — PROGRESS NOTES
Pulmonary Outpatient Note   Jennifer Frey MD       12/14/2017    Chief Complaint:  Cough (Couhing up dark yellow phlem )     HPI:   47y.o. year old female here for an acute visit due to worsening shortness of breath    For the last week she has been experiencing worsening shortness of breath with mild exertion requiring the use of her rescue albuterol inhaler, a cough productive of clear sputum, and wheezing. Denies any fevers, has constant sick contacts due to her job as a . States that she typically gets frequent respiratory infections in the winter season. Last acute exac was last month recovered after treatment with antibiotics and steroids but did develop oral candidiasis requiring treatment.        Past Medical History:   Diagnosis Date    Asthma     Back pain     Headache     Neck pain        Past Surgical History:   Procedure Laterality Date    FOOT SURGERY      right    HIP SURGERY      HYSTERECTOMY      NECK SURGERY      NECK SURGERY         Social History   Substance Use Topics    Smoking status: Former Smoker     Packs/day: 0.50     Years: 10.00     Types: Cigarettes     Quit date: 1/1/2007    Smokeless tobacco: Never Used    Alcohol use Not on file       Family History   Problem Relation Age of Onset    High Blood Pressure Father     Heart Disease Father      aorta ruptured    Heart Disease Maternal Grandmother      pacemaker         Current Outpatient Prescriptions:     ipratropium (ATROVENT) 0.02 % nebulizer solution, Take 2.5 mLs by nebulization 4 times daily, Disp: 2.5 mL, Rfl: 3    levofloxacin (LEVAQUIN) 500 MG tablet, Take 1 tablet by mouth daily for 7 days, Disp: 7 tablet, Rfl: 0    predniSONE (DELTASONE) 20 MG tablet, Take 2 tablets by mouth daily for 5 days, Disp: 10 tablet, Rfl: 0    beclomethasone (QNASL) 80 MCG/ACT AERS nasal spray, 2 sprays by Each Nare route 2 times daily, Disp: 1 Inhaler, Rfl: 0    mometasone-formoterol (DULERA) 200-5 MCG/ACT inhaler, Inhale 2 puffs into the lungs every 12 hours, Disp: 1 Inhaler, Rfl: 11    omeprazole (PRILOSEC) 20 MG delayed release capsule, , Disp: , Rfl: 5    vitamin B-2 (RIBOFLAVIN) 100 MG TABS tablet, , Disp: , Rfl: 3    montelukast (SINGULAIR) 10 MG tablet, Take 10 mg by mouth nightly, Disp: , Rfl:     cetirizine (ZYRTEC) 10 MG tablet, Take 10 mg by mouth daily, Disp: , Rfl:     naloxegol (MOVANTIK) 25 MG TABS tablet, Take 25 mg by mouth every morning, Disp: , Rfl:     Multiple Vitamins-Minerals (THERAPEUTIC MULTIVITAMIN-MINERALS) tablet, Take 1 tablet by mouth daily, Disp: , Rfl:     Ascorbic Acid (VITAMIN C) 250 MG tablet, Take 1,000 mg by mouth daily, Disp: , Rfl:     estrogens, conjugated, (PREMARIN) 1.25 MG tablet, Take by mouth, Disp: , Rfl:     morphine (AVINZA) 60 MG extended release capsule, Take 60 mg by mouth ., Disp: , Rfl:     morphine (MS CONTIN) 15 MG extended release tablet, 30 mg  ., Disp: , Rfl: 0    Levalbuterol HCl (XOPENEX IN), Inhale into the lungs, Disp: , Rfl:     levalbuterol (XOPENEX) 0.31 MG/3ML nebulization, Take 1 ampule by nebulization every 4 hours as needed for Wheezing, Disp: , Rfl:     SUMAtriptan (IMITREX) 100 MG tablet, Take 200 mg by mouth once as needed. , Disp: , Rfl:     SUMAtriptan (IMITREX) 5 MG/ACT nasal spray, 1 spray by Nasal route daily as needed. , Disp: , Rfl:     quetiapine (SEROQUEL) 100 MG tablet, Take 50 mg by mouth nightly , Disp: , Rfl:     topiramate (TOPAMAX) 100 MG tablet, Take 200 mg by mouth daily. , Disp: , Rfl:     ibuprofen (IBU) 600 MG tablet, Take 1 tablet by mouth every 6 hours as needed for Pain. Take with food. (Patient taking differently: Take 800 mg by mouth every 6 hours as needed for Pain Take with food.), Disp: 20 tablet, Rfl: 0    Latex; Acyclovir; Codeine; Eggs [egg white];  Penicillins; and Sulfa antibiotics    Vitals:    12/14/17 0954   BP: 118/66   Pulse: 77   Resp: 16   Temp: 97.8 °F (36.6 °C)   TempSrc: Oral   SpO2: 98%   Weight: 151 nodule      ASSESSMENT:    1. Acute respiratory infection    2. Severe persistent asthma with acute exacerbation      PLAN:    -Start treatment for acute exacerbation of asthma and associated acute respiratory infection with a course of prednisone and antibiotics. Reviewed adverse effects to watch for   -Check sputum culture  -Recurrent infection history will check immunoglobulins  -Escalate regimen with nebulized Duonebs  -Close follow up if not improving, instructed her to notify us or seek emergent medical attention.      Orders Placed This Encounter   Procedures    Respiratory Culture    IgG, IgA, IgM

## 2017-12-27 ENCOUNTER — TELEPHONE (OUTPATIENT)
Dept: PULMONOLOGY | Age: 52
End: 2017-12-27

## 2017-12-27 DIAGNOSIS — B37.0 ORAL CANDIDIASIS: Primary | ICD-10-CM

## 2017-12-27 RX ORDER — FLUCONAZOLE 100 MG/1
100 TABLET ORAL DAILY
Qty: 7 TABLET | Refills: 0 | Status: SHIPPED | OUTPATIENT
Start: 2017-12-27 | End: 2018-01-03

## 2017-12-27 NOTE — TELEPHONE ENCOUNTER
Patient states she has thrush , on left side of the throat.  Patient is requesting medication sent to pharmacy

## 2018-01-01 ENCOUNTER — HOSPITAL ENCOUNTER (OUTPATIENT)
Dept: OTHER | Age: 53
Discharge: HOME | End: 2018-01-31
Attending: INTERNAL MEDICINE | Admitting: INTERNAL MEDICINE

## 2018-01-02 ENCOUNTER — TELEPHONE (OUTPATIENT)
Dept: PULMONOLOGY | Age: 53
End: 2018-01-02

## 2018-01-03 ENCOUNTER — TELEPHONE (OUTPATIENT)
Dept: PULMONOLOGY | Age: 53
End: 2018-01-03

## 2018-01-03 DIAGNOSIS — J45.50 SEVERE PERSISTENT ASTHMA WITHOUT COMPLICATION: Primary | ICD-10-CM

## 2018-01-03 RX ORDER — LEVALBUTEROL INHALATION SOLUTION 0.31 MG/3ML
1 SOLUTION RESPIRATORY (INHALATION) EVERY 4 HOURS PRN
Qty: 360 ML | Refills: 11 | Status: SHIPPED | OUTPATIENT
Start: 2018-01-03 | End: 2018-07-17 | Stop reason: ALTCHOICE

## 2018-01-18 ENCOUNTER — TELEPHONE (OUTPATIENT)
Dept: PULMONOLOGY | Age: 53
End: 2018-01-18

## 2018-01-18 DIAGNOSIS — J20.8 ACUTE BRONCHITIS DUE TO OTHER SPECIFIED ORGANISMS: Primary | ICD-10-CM

## 2018-01-18 RX ORDER — PREDNISONE 10 MG/1
TABLET ORAL
Qty: 30 TABLET | Refills: 0 | Status: SHIPPED | OUTPATIENT
Start: 2018-01-18 | End: 2018-01-28

## 2018-01-18 RX ORDER — LEVOFLOXACIN 750 MG/1
750 TABLET ORAL DAILY
Qty: 7 TABLET | Refills: 0 | Status: SHIPPED | OUTPATIENT
Start: 2018-01-18 | End: 2018-01-25

## 2018-02-09 ENCOUNTER — TELEPHONE (OUTPATIENT)
Dept: PULMONOLOGY | Age: 53
End: 2018-02-09

## 2018-02-09 DIAGNOSIS — B37.0 ORAL CANDIDIASIS: Primary | ICD-10-CM

## 2018-02-09 RX ORDER — FLUCONAZOLE 100 MG/1
100 TABLET ORAL DAILY
Qty: 7 TABLET | Refills: 0 | Status: SHIPPED | OUTPATIENT
Start: 2018-02-09 | End: 2018-02-15 | Stop reason: ALTCHOICE

## 2018-02-09 NOTE — TELEPHONE ENCOUNTER
Pharm called and had a couple questions regards scripts sent. Fluconazole is contraindicated with patient's Movantik, do you want to change? ? Also question on Nystatin, it is 5ml 4 times a day? If directions are correct then quantity is incorrect. Please advise.

## 2018-02-15 ENCOUNTER — OFFICE VISIT (OUTPATIENT)
Dept: PULMONOLOGY | Age: 53
End: 2018-02-15

## 2018-02-15 ENCOUNTER — HOSPITAL ENCOUNTER (OUTPATIENT)
Dept: OTHER | Age: 53
Discharge: HOME | End: 2018-02-15
Attending: INTERNAL MEDICINE | Admitting: INTERNAL MEDICINE

## 2018-02-15 VITALS
OXYGEN SATURATION: 97 % | WEIGHT: 148 LBS | TEMPERATURE: 97.5 F | RESPIRATION RATE: 16 BRPM | HEIGHT: 68 IN | BODY MASS INDEX: 22.43 KG/M2 | SYSTOLIC BLOOD PRESSURE: 109 MMHG | DIASTOLIC BLOOD PRESSURE: 64 MMHG | HEART RATE: 71 BPM

## 2018-02-15 DIAGNOSIS — R13.10 DYSPHAGIA, UNSPECIFIED TYPE: ICD-10-CM

## 2018-02-15 DIAGNOSIS — J45.51 SEVERE PERSISTENT ASTHMA WITH ACUTE EXACERBATION: Primary | ICD-10-CM

## 2018-02-15 DIAGNOSIS — J45.51 SEVERE PERSISTENT ASTHMA WITH ACUTE EXACERBATION: ICD-10-CM

## 2018-02-15 PROCEDURE — 3017F COLORECTAL CA SCREEN DOC REV: CPT | Performed by: INTERNAL MEDICINE

## 2018-02-15 PROCEDURE — G8427 DOCREV CUR MEDS BY ELIG CLIN: HCPCS | Performed by: INTERNAL MEDICINE

## 2018-02-15 PROCEDURE — G8420 CALC BMI NORM PARAMETERS: HCPCS | Performed by: INTERNAL MEDICINE

## 2018-02-15 PROCEDURE — G8484 FLU IMMUNIZE NO ADMIN: HCPCS | Performed by: INTERNAL MEDICINE

## 2018-02-15 PROCEDURE — 1036F TOBACCO NON-USER: CPT | Performed by: INTERNAL MEDICINE

## 2018-02-15 PROCEDURE — 3014F SCREEN MAMMO DOC REV: CPT | Performed by: INTERNAL MEDICINE

## 2018-02-15 PROCEDURE — 99214 OFFICE O/P EST MOD 30 MIN: CPT | Performed by: INTERNAL MEDICINE

## 2018-02-15 RX ORDER — BUDESONIDE AND FORMOTEROL FUMARATE DIHYDRATE 80; 4.5 UG/1; UG/1
2 AEROSOL RESPIRATORY (INHALATION) 2 TIMES DAILY
Qty: 1 INHALER | Refills: 11 | Status: SHIPPED | OUTPATIENT
Start: 2018-02-15 | End: 2018-07-17 | Stop reason: ALTCHOICE

## 2018-02-15 RX ORDER — LEVOFLOXACIN 500 MG/1
TABLET, FILM COATED ORAL
COMMUNITY
Start: 2018-02-11 | End: 2018-02-28 | Stop reason: ALTCHOICE

## 2018-02-15 RX ORDER — PREDNISONE 20 MG/1
40 TABLET ORAL DAILY
Qty: 14 TABLET | Refills: 0 | Status: SHIPPED | OUTPATIENT
Start: 2018-02-15 | End: 2018-02-22

## 2018-02-15 ASSESSMENT — ENCOUNTER SYMPTOMS
DIARRHEA: 0
EYE PAIN: 0
EYE DISCHARGE: 0
EYE ITCHING: 0
ABDOMINAL PAIN: 0
SINUS PAIN: 0
WHEEZING: 0
SINUS PRESSURE: 0
CONSTIPATION: 0
COUGH: 1
SORE THROAT: 0
VOICE CHANGE: 0
SHORTNESS OF BREATH: 1

## 2018-02-15 NOTE — PROGRESS NOTES
Negative for agitation, confusion and hallucinations. Physical Exam   Constitutional: She appears well-developed and well-nourished. No distress. HENT:   Head: Normocephalic and atraumatic. Mouth/Throat: Oropharynx is clear and moist. No oropharyngeal exudate. Eyes: EOM are normal. Pupils are equal, round, and reactive to light. Neck: Neck supple. No JVD present. Cardiovascular: Normal heart sounds. Exam reveals no gallop and no friction rub. No murmur heard. Pulmonary/Chest: Effort normal. She has no wheezes. She has no rales. Equal chest rise and expansion bilaterally   Abdominal: Soft. Bowel sounds are normal. She exhibits no distension. There is no tenderness. Musculoskeletal: Normal range of motion. She exhibits no edema. Lymphadenopathy:     She has no cervical adenopathy. Neurological: She is alert. No cranial nerve deficit. CN 2-12 grossly intact   Skin: Skin is warm and dry. No rash noted. She is not diaphoretic. PFTs personally reviewed, severe obstruction with a bronchodilator response. CT chest personally reviewed, 4mm right upper lobe nodule      ASSESSMENT:    1. Severe persistent asthma with acute exacerbation    2. Dysphagia, unspecified type      PLAN:    -Will start treatment for an acute asthma exacerbation with a short course of steroids.   -Encouraged her to continue using nebulized bronchodilator, will also try to resubmit script for Symbicort  -Has recurrent respiratory infections and ongoing difficulty with swallowing, this may relate to an aspiration etiology. Will send request for fiberoptic speech evaluation/speech referral to look for this. Depending on findings will see if additional testing/treatment is needed.    -If not improved after the above will consider bronchoscopic intervention to obtain directed specimen for culture  -Repeat CXR to evaluate prior reported pneumonia   -Follow up in 2 weeks    Orders Placed This Encounter   Procedures    XR

## 2018-02-23 ENCOUNTER — HOSPITAL ENCOUNTER (OUTPATIENT)
Dept: OTHER | Age: 53
Discharge: HOME | End: 2018-02-23
Attending: INTERNAL MEDICINE | Admitting: INTERNAL MEDICINE

## 2018-02-28 ENCOUNTER — OFFICE VISIT (OUTPATIENT)
Dept: PULMONOLOGY | Age: 53
End: 2018-02-28

## 2018-02-28 VITALS
OXYGEN SATURATION: 96 % | HEART RATE: 65 BPM | WEIGHT: 147 LBS | DIASTOLIC BLOOD PRESSURE: 55 MMHG | BODY MASS INDEX: 22.28 KG/M2 | HEIGHT: 68 IN | SYSTOLIC BLOOD PRESSURE: 101 MMHG | TEMPERATURE: 98.2 F | RESPIRATION RATE: 16 BRPM

## 2018-02-28 DIAGNOSIS — J45.50 ASTHMA, SEVERE PERSISTENT, POORLY-CONTROLLED, UNCOMPLICATED: Primary | ICD-10-CM

## 2018-02-28 PROCEDURE — 3017F COLORECTAL CA SCREEN DOC REV: CPT | Performed by: INTERNAL MEDICINE

## 2018-02-28 PROCEDURE — 99214 OFFICE O/P EST MOD 30 MIN: CPT | Performed by: INTERNAL MEDICINE

## 2018-02-28 PROCEDURE — G8484 FLU IMMUNIZE NO ADMIN: HCPCS | Performed by: INTERNAL MEDICINE

## 2018-02-28 PROCEDURE — 1036F TOBACCO NON-USER: CPT | Performed by: INTERNAL MEDICINE

## 2018-02-28 PROCEDURE — G8427 DOCREV CUR MEDS BY ELIG CLIN: HCPCS | Performed by: INTERNAL MEDICINE

## 2018-02-28 PROCEDURE — G8420 CALC BMI NORM PARAMETERS: HCPCS | Performed by: INTERNAL MEDICINE

## 2018-02-28 PROCEDURE — 3014F SCREEN MAMMO DOC REV: CPT | Performed by: INTERNAL MEDICINE

## 2018-02-28 RX ORDER — LEVALBUTEROL TARTRATE 45 UG/1
1-2 AEROSOL, METERED ORAL EVERY 4 HOURS PRN
Qty: 1 INHALER | Refills: 3 | Status: SHIPPED | OUTPATIENT
Start: 2018-02-28 | End: 2019-03-05 | Stop reason: SDUPTHER

## 2018-02-28 ASSESSMENT — ENCOUNTER SYMPTOMS
SHORTNESS OF BREATH: 1
EYE DISCHARGE: 0
VOICE CHANGE: 0
CONSTIPATION: 0
EYE PAIN: 0
WHEEZING: 1
SORE THROAT: 0
DIARRHEA: 0
COUGH: 1
EYE ITCHING: 0
ABDOMINAL PAIN: 0

## 2018-02-28 NOTE — PROGRESS NOTES
nebulization, Take 3 mLs by nebulization every 4 hours as needed for Wheezing, Disp: 360 mL, Rfl: 11    ipratropium (ATROVENT) 0.02 % nebulizer solution, Take 2.5 mLs by nebulization 4 times daily, Disp: 2.5 mL, Rfl: 3    beclomethasone (QNASL) 80 MCG/ACT AERS nasal spray, 2 sprays by Each Nare route 2 times daily, Disp: 1 Inhaler, Rfl: 0    omeprazole (PRILOSEC) 20 MG delayed release capsule, , Disp: , Rfl: 5    vitamin B-2 (RIBOFLAVIN) 100 MG TABS tablet, , Disp: , Rfl: 3    montelukast (SINGULAIR) 10 MG tablet, Take 10 mg by mouth nightly, Disp: , Rfl:     cetirizine (ZYRTEC) 10 MG tablet, Take 10 mg by mouth daily, Disp: , Rfl:     naloxegol (MOVANTIK) 25 MG TABS tablet, Take 25 mg by mouth every morning, Disp: , Rfl:     Multiple Vitamins-Minerals (THERAPEUTIC MULTIVITAMIN-MINERALS) tablet, Take 1 tablet by mouth daily, Disp: , Rfl:     Ascorbic Acid (VITAMIN C) 250 MG tablet, Take 1,000 mg by mouth daily, Disp: , Rfl:     estrogens, conjugated, (PREMARIN) 1.25 MG tablet, Take by mouth, Disp: , Rfl:     morphine (AVINZA) 60 MG extended release capsule, Take 60 mg by mouth ., Disp: , Rfl:     SUMAtriptan (IMITREX) 100 MG tablet, Take 200 mg by mouth once as needed. , Disp: , Rfl:     SUMAtriptan (IMITREX) 5 MG/ACT nasal spray, 1 spray by Nasal route daily as needed. , Disp: , Rfl:     quetiapine (SEROQUEL) 100 MG tablet, Take 50 mg by mouth nightly , Disp: , Rfl:     topiramate (TOPAMAX) 100 MG tablet, Take 200 mg by mouth daily. , Disp: , Rfl:     ibuprofen (IBU) 600 MG tablet, Take 1 tablet by mouth every 6 hours as needed for Pain. Take with food. (Patient taking differently: Take 800 mg by mouth every 6 hours as needed for Pain Take with food.), Disp: 20 tablet, Rfl: 0    Levalbuterol HCl (XOPENEX IN), Inhale into the lungs, Disp: , Rfl:     Latex; Acyclovir; Astemizole; Codeine; Eggs [egg white];  Other; Penicillins; Sulfa antibiotics; and Tobramycin-dexamethasone    Vitals:    02/28/18 1002

## 2018-03-02 ENCOUNTER — HOSPITAL ENCOUNTER (OUTPATIENT)
Dept: SPEECH THERAPY | Age: 53
Discharge: HOME | End: 2018-03-02
Attending: INTERNAL MEDICINE | Admitting: INTERNAL MEDICINE

## 2018-03-02 NOTE — PROCEDURES
[facility]  Fiberoptic Endoscopic Evaluation of Swallowing  (FEES)      NAME: Kassi Trinidad OF BIRTH: 1965  GENDER: female  MRN: [id number]  ACCOUNT #: [de-identified]  REFERRING PHYSICIAN: Isrrael Dejesus MD  DIAGNOSIS: Dysphagia (R13.10)  PAIN: The pt reported being in chronic neck and back pain for the past 20 years. EXAM DATE: 3/2/18  EXAM LOCATION: [facility]    CASE HISTORY: The pt reports dysphagia symptoms specifically trouble swallowing solids. She reports chronic hoarseness and dysphonia. She reports having previous neck surgery 20 years ago with dysphagia and dysphonia since this time. FLEXIBLE ENDOSCOPIC EXAM: The endoscope was successfully passed through the right nostril without issue. At the conclusion of the procedure the endoscope was removed without incident. TESTING POSITION: Sitting at 90 degrees in a chair    ORAL MOTOR FUNCTION: n/a    BASE OF TONGUE RANGE OF MOTION: Reduced; More reduced on left side vs right side    LATERAL PHARYNGEAL WALL RANGE OF MOTION: Reduced    VELOPHARYNGEAL FUNCTION: WFL; Noted thick secretions in this area    TRUE VOCAL FOLDS: Some erythema at left posterior fold    FALSE VOCAL FOLDS: WFL    ARYTENOID CARTILAGE: Edema; The arytenoids cover the vocal folds completely during phonation;  Apparent hyper-adduction during phonation    ARYEPIGLOTTIC FOLDS: WFL    INTERARYTENOID SPACE: WFL    BASELINE SECRETIONS: Significant secretions along the posterior pharyngeal wall      EPIGLOTTIC RIM RESTING POSITION: Contacts the posterior pharyngeal wall and nears contact with lateral pharyngeal wall at rest (Apparent large epiglottis)    VOCAL PITCH: Reduced in audible voice quality but appears to have adequate ROM in reference to epiglottic movement    VOCAL INTENSITY: Reduced    VOCAL QUALITY: Significantly hoarse and at time strained    RESPIRATORY SUPPORT FOR PHONATION: Reduced    SUSTAINED PHONATION: 5 seconds Material enters the airway, passes below the vocal folds, and is not ejected from the trachea despite effort  [] 6 Material enters the airway, passes below the vocal folds, and is ejected into the larynx or out of the airway  [] 5 Material enters the airway, contacts the vocal folds, and is not ejected into the airway  [] 4 Material enters the airway, contacts the vocal folds, and is ejected from the airway  [] 3 Material enters the airway, remains above the vocal folds, and is not ejected from airway  [x] 2 Material enters the airway, remains above the vocal folds, and is ejected from airway  [] 1 Material does not enter the airway      IMPRESSION:  The endoscope was passed and removed along the right nostril without issue or reported discomfort. The pt was found to have an apparent larger epiglottis with contact between the epiglottis and posterior pharyngeal wall at rest. The epiglottis also neared contact the with lateral pharyngeal wall. There was noted edema of the arytenoids with the coverage of the arytenoids over the vocal folds during phonation. She also had apparent hyperadduction during phonation along with significantly dysphonic vocal quality. There were periods of mild bubbling up of liquids around the UES post-swallow. There was noted increased opening of the right vocal fold than the left. Likewise, tongue base retraction was reduced but more on the left than the right. There was laryngeal penetration thin liquid via larger cup sip. A reflexive cough was triggered that cleared this penetration. There was increased premature spillage on the left side of the pharynx with thin liquids in a mixed consistency with again penetration that was cleared with a weak reflexive cough. Noted moderate pharyngeal residue with a trials of a colin cracker with x 2 instances of part of this bolus reaching the epiglottic rim. One of these occurrence nearly resulted in penetration but was cleared with 2nd swallow.

## 2018-03-07 ENCOUNTER — HOSPITAL ENCOUNTER (OUTPATIENT)
Dept: SPEECH THERAPY | Age: 53
Discharge: HOME | End: 2018-03-31
Attending: INTERNAL MEDICINE | Admitting: INTERNAL MEDICINE

## 2018-03-07 DIAGNOSIS — R13.12 OROPHARYNGEAL DYSPHAGIA: Primary | ICD-10-CM

## 2018-03-07 DIAGNOSIS — R49.0 DYSPHONIA: ICD-10-CM

## 2018-03-15 ENCOUNTER — HOSPITAL ENCOUNTER (OUTPATIENT)
Dept: OTHER | Age: 53
Discharge: HOME | End: 2018-03-15
Attending: ALLERGY & IMMUNOLOGY | Admitting: ALLERGY & IMMUNOLOGY

## 2018-03-15 LAB
BASOPHILS ABSOLUTE: 0 K/UL (ref 0–0.2)
BASOPHILS RELATIVE PERCENT: 1 %
EOSINOPHILS ABSOLUTE: 0.1 K/UL (ref 0–0.6)
EOSINOPHILS RELATIVE PERCENT: 2.1 %
HCT VFR BLD CALC: 38.1 % (ref 36–48)
HEMOGLOBIN: 12.9 G/DL (ref 12–16)
LYMPHOCYTES ABSOLUTE: 1.5 K/UL (ref 1–5.1)
LYMPHOCYTES RELATIVE PERCENT: 35.1 %
MCH RBC QN AUTO: 30.2 PG (ref 26–34)
MCHC RBC AUTO-ENTMCNC: 33.8 G/DL (ref 31–36)
MCV RBC AUTO: 89.2 FL (ref 80–100)
MONOCYTES ABSOLUTE: 0.3 K/UL (ref 0–1.3)
MONOCYTES RELATIVE PERCENT: 7.9 %
NEUTROPHILS ABSOLUTE: 2.2 K/UL (ref 1.7–7.7)
NEUTROPHILS RELATIVE PERCENT: 53.9 %
PDW BLD-RTO: 13.9 % (ref 12.4–15.4)
PLATELET # BLD: 209 K/UL (ref 135–450)
PMV BLD AUTO: 9.8 FL (ref 5–10.5)
RBC # BLD: 4.27 M/UL (ref 4–5.2)
WBC # BLD: 4.2 K/UL (ref 4–11)

## 2018-03-16 LAB
ALLERGEN BERMUDA GRASS IGE: <0.1 KU/L
ALLERGEN COMMON SHORT RAGWEED IGE: <0.1 KU/L
ALLERGEN SEE NOTE: NORMAL
ALLERGEN TIMOTHY GRASS: <0.1 KU/L
ANCA IFA: NORMAL
MISCELLANEOUS LAB TEST ORDER: NORMAL

## 2018-03-17 LAB
2000687N OAK TREE IGE: <0.1 KU/L
ALLERGEN ASPERGILLUS ALTERNATA IGE: <0.1 KU/L
ALLERGEN ASPERGILLUS FUMIGATUS IGE: <0.1 KU/L
ALLERGEN BIRCH IGE: <0.1 KU/L
ALLERGEN CAT DANDER IGE: <0.1 KU/L
ALLERGEN DOG DANDER IGE: <0.1 KU/L
ALLERGEN ELM IGE: <0.1 KU/L
ALLERGEN ENGLISH PLANTAIN: <0.1 KU/L
ALLERGEN MAPLE/BOX ELDER IGE: <0.1 KU/L
ALLERGEN MITE DUST FARINAE IGE: <0.1 KU/L

## 2018-03-22 ENCOUNTER — OFFICE VISIT (OUTPATIENT)
Dept: PULMONOLOGY | Age: 53
End: 2018-03-22

## 2018-03-22 VITALS
HEART RATE: 54 BPM | DIASTOLIC BLOOD PRESSURE: 69 MMHG | BODY MASS INDEX: 23.23 KG/M2 | TEMPERATURE: 97 F | RESPIRATION RATE: 16 BRPM | OXYGEN SATURATION: 98 % | SYSTOLIC BLOOD PRESSURE: 110 MMHG | WEIGHT: 148 LBS | HEIGHT: 67 IN

## 2018-03-22 DIAGNOSIS — J30.1 CHRONIC SEASONAL ALLERGIC RHINITIS DUE TO POLLEN: ICD-10-CM

## 2018-03-22 DIAGNOSIS — J45.50 ASTHMA, SEVERE PERSISTENT, WELL-CONTROLLED: Primary | ICD-10-CM

## 2018-03-22 PROCEDURE — 3017F COLORECTAL CA SCREEN DOC REV: CPT | Performed by: INTERNAL MEDICINE

## 2018-03-22 PROCEDURE — G8427 DOCREV CUR MEDS BY ELIG CLIN: HCPCS | Performed by: INTERNAL MEDICINE

## 2018-03-22 PROCEDURE — 1036F TOBACCO NON-USER: CPT | Performed by: INTERNAL MEDICINE

## 2018-03-22 PROCEDURE — G8482 FLU IMMUNIZE ORDER/ADMIN: HCPCS | Performed by: INTERNAL MEDICINE

## 2018-03-22 PROCEDURE — 3014F SCREEN MAMMO DOC REV: CPT | Performed by: INTERNAL MEDICINE

## 2018-03-22 PROCEDURE — G8420 CALC BMI NORM PARAMETERS: HCPCS | Performed by: INTERNAL MEDICINE

## 2018-03-22 PROCEDURE — 99213 OFFICE O/P EST LOW 20 MIN: CPT | Performed by: INTERNAL MEDICINE

## 2018-03-22 RX ORDER — TIZANIDINE 4 MG/1
4 TABLET ORAL NIGHTLY
COMMUNITY
Start: 2018-03-14

## 2018-03-23 DIAGNOSIS — J30.89 OTHER ALLERGIC RHINITIS: Primary | ICD-10-CM

## 2018-03-26 RX ORDER — FLUNISOLIDE 0.25 MG/ML
2 SOLUTION NASAL EVERY 12 HOURS
Qty: 1 BOTTLE | Refills: 3 | Status: SHIPPED | OUTPATIENT
Start: 2018-03-26 | End: 2018-07-17 | Stop reason: ALTCHOICE

## 2018-03-26 NOTE — TELEPHONE ENCOUNTER
I will send a script for her insurance preferred medication (flunisolide) since prior auth for Qnasl was denied

## 2018-04-01 ENCOUNTER — HOSPITAL ENCOUNTER (OUTPATIENT)
Dept: OTHER | Age: 53
Discharge: HOME | End: 2018-04-30
Attending: INTERNAL MEDICINE | Admitting: INTERNAL MEDICINE

## 2018-04-11 ASSESSMENT — ENCOUNTER SYMPTOMS
EYE ITCHING: 0
EYE PAIN: 0
VOICE CHANGE: 0
SORE THROAT: 0
CONSTIPATION: 0
ABDOMINAL PAIN: 0
DIARRHEA: 0
COUGH: 1
SHORTNESS OF BREATH: 0
EYE DISCHARGE: 0
WHEEZING: 0

## 2018-04-17 ENCOUNTER — OFFICE VISIT (OUTPATIENT)
Dept: PULMONOLOGY | Age: 53
End: 2018-04-17

## 2018-04-17 VITALS
SYSTOLIC BLOOD PRESSURE: 128 MMHG | HEART RATE: 70 BPM | OXYGEN SATURATION: 97 % | HEIGHT: 67 IN | DIASTOLIC BLOOD PRESSURE: 69 MMHG | TEMPERATURE: 98.2 F | BODY MASS INDEX: 23.54 KG/M2 | RESPIRATION RATE: 16 BRPM | WEIGHT: 150 LBS

## 2018-04-17 DIAGNOSIS — R13.12 OROPHARYNGEAL DYSPHAGIA: ICD-10-CM

## 2018-04-17 DIAGNOSIS — J30.2 OTHER SEASONAL ALLERGIC RHINITIS: ICD-10-CM

## 2018-04-17 DIAGNOSIS — J45.50 POORLY CONTROLLED SEVERE PERSISTENT ASTHMA WITHOUT COMPLICATION: Primary | ICD-10-CM

## 2018-04-17 PROCEDURE — 1036F TOBACCO NON-USER: CPT | Performed by: INTERNAL MEDICINE

## 2018-04-17 PROCEDURE — 99214 OFFICE O/P EST MOD 30 MIN: CPT | Performed by: INTERNAL MEDICINE

## 2018-04-17 PROCEDURE — G8420 CALC BMI NORM PARAMETERS: HCPCS | Performed by: INTERNAL MEDICINE

## 2018-04-17 PROCEDURE — 3017F COLORECTAL CA SCREEN DOC REV: CPT | Performed by: INTERNAL MEDICINE

## 2018-04-17 PROCEDURE — 3014F SCREEN MAMMO DOC REV: CPT | Performed by: INTERNAL MEDICINE

## 2018-04-17 PROCEDURE — G8427 DOCREV CUR MEDS BY ELIG CLIN: HCPCS | Performed by: INTERNAL MEDICINE

## 2018-04-17 ASSESSMENT — ENCOUNTER SYMPTOMS
EYE ITCHING: 0
SORE THROAT: 0
EYE PAIN: 0
COUGH: 0
SHORTNESS OF BREATH: 1
CONSTIPATION: 0
EYE DISCHARGE: 0
WHEEZING: 0
VOICE CHANGE: 0
DIARRHEA: 0
ABDOMINAL PAIN: 0

## 2018-04-20 ENCOUNTER — OFFICE VISIT (OUTPATIENT)
Dept: PULMONOLOGY | Age: 53
End: 2018-04-20

## 2018-04-20 DIAGNOSIS — Z23 NEED FOR PNEUMOCOCCAL VACCINE: Primary | ICD-10-CM

## 2018-04-20 PROCEDURE — 90670 PCV13 VACCINE IM: CPT | Performed by: INTERNAL MEDICINE

## 2018-04-20 PROCEDURE — 90471 IMMUNIZATION ADMIN: CPT | Performed by: INTERNAL MEDICINE

## 2018-05-01 ENCOUNTER — HOSPITAL ENCOUNTER (OUTPATIENT)
Dept: OTHER | Age: 53
Discharge: HOME | End: 2018-05-31
Attending: INTERNAL MEDICINE | Admitting: INTERNAL MEDICINE

## 2018-05-16 ENCOUNTER — HOSPITAL ENCOUNTER (OUTPATIENT)
Dept: MAMMOGRAPHY | Age: 53
Discharge: HOME | End: 2018-05-16
Admitting: OBSTETRICS & GYNECOLOGY

## 2018-05-16 DIAGNOSIS — Z12.31 ENCOUNTER FOR SCREENING MAMMOGRAM FOR BREAST CANCER: ICD-10-CM

## 2018-06-01 ENCOUNTER — HOSPITAL ENCOUNTER (OUTPATIENT)
Dept: OTHER | Age: 53
Discharge: HOME | End: 2018-06-30
Attending: INTERNAL MEDICINE | Admitting: INTERNAL MEDICINE

## 2018-06-05 ENCOUNTER — TELEPHONE (OUTPATIENT)
Dept: PULMONOLOGY | Age: 53
End: 2018-06-05

## 2018-07-01 ENCOUNTER — HOSPITAL ENCOUNTER (OUTPATIENT)
Dept: OTHER | Age: 53
Discharge: HOME | End: 2018-07-01
Attending: INTERNAL MEDICINE | Admitting: INTERNAL MEDICINE

## 2018-07-17 ENCOUNTER — OFFICE VISIT (OUTPATIENT)
Dept: PULMONOLOGY | Age: 53
End: 2018-07-17

## 2018-07-17 VITALS
TEMPERATURE: 98.1 F | DIASTOLIC BLOOD PRESSURE: 66 MMHG | OXYGEN SATURATION: 97 % | SYSTOLIC BLOOD PRESSURE: 111 MMHG | HEART RATE: 76 BPM | HEIGHT: 67 IN | WEIGHT: 150 LBS | BODY MASS INDEX: 23.54 KG/M2 | RESPIRATION RATE: 16 BRPM

## 2018-07-17 DIAGNOSIS — J45.50 ASTHMA, SEVERE PERSISTENT, WELL-CONTROLLED: Primary | ICD-10-CM

## 2018-07-17 PROCEDURE — G8427 DOCREV CUR MEDS BY ELIG CLIN: HCPCS | Performed by: INTERNAL MEDICINE

## 2018-07-17 PROCEDURE — 3017F COLORECTAL CA SCREEN DOC REV: CPT | Performed by: INTERNAL MEDICINE

## 2018-07-17 PROCEDURE — 99213 OFFICE O/P EST LOW 20 MIN: CPT | Performed by: INTERNAL MEDICINE

## 2018-07-17 PROCEDURE — 1036F TOBACCO NON-USER: CPT | Performed by: INTERNAL MEDICINE

## 2018-07-17 PROCEDURE — G8420 CALC BMI NORM PARAMETERS: HCPCS | Performed by: INTERNAL MEDICINE

## 2018-07-17 ASSESSMENT — ENCOUNTER SYMPTOMS
VOICE CHANGE: 0
EYE DISCHARGE: 0
SORE THROAT: 0
SHORTNESS OF BREATH: 0
CONSTIPATION: 0
DIARRHEA: 0
COUGH: 0
ABDOMINAL PAIN: 0
WHEEZING: 0
EYE ITCHING: 0
EYE PAIN: 0

## 2018-07-17 NOTE — PROGRESS NOTES
5    vitamin B-2 (RIBOFLAVIN) 100 MG TABS tablet, , Disp: , Rfl: 3    montelukast (SINGULAIR) 10 MG tablet, Take 10 mg by mouth nightly, Disp: , Rfl:     cetirizine (ZYRTEC) 10 MG tablet, Take 10 mg by mouth daily, Disp: , Rfl:     naloxegol (MOVANTIK) 25 MG TABS tablet, Take 25 mg by mouth every morning, Disp: , Rfl:     Multiple Vitamins-Minerals (THERAPEUTIC MULTIVITAMIN-MINERALS) tablet, Take 1 tablet by mouth daily, Disp: , Rfl:     Ascorbic Acid (VITAMIN C) 250 MG tablet, Take 1,000 mg by mouth daily, Disp: , Rfl:     estrogens, conjugated, (PREMARIN) 1.25 MG tablet, Take by mouth, Disp: , Rfl:     morphine (AVINZA) 60 MG extended release capsule, Take 60 mg by mouth ., Disp: , Rfl:     SUMAtriptan (IMITREX) 100 MG tablet, Take 200 mg by mouth once as needed. , Disp: , Rfl:     SUMAtriptan (IMITREX) 5 MG/ACT nasal spray, 1 spray by Nasal route daily as needed. , Disp: , Rfl:     quetiapine (SEROQUEL) 100 MG tablet, Take 50 mg by mouth nightly , Disp: , Rfl:     topiramate (TOPAMAX) 100 MG tablet, Take 200 mg by mouth daily. , Disp: , Rfl:     ibuprofen (IBU) 600 MG tablet, Take 1 tablet by mouth every 6 hours as needed for Pain. Take with food. (Patient taking differently: Take 800 mg by mouth every 6 hours as needed for Pain Take with food.), Disp: 20 tablet, Rfl: 0    Latex; Acyclovir; Astemizole; Dye fdc red 3 (erythrosine); Eggs [egg white]; Other; Penicillins; Sulfa antibiotics; Tobramycin-dexamethasone; and Codeine    Vitals:    07/17/18 1116   BP: 111/66   Pulse: 76   Resp: 16   Temp: 98.1 °F (36.7 °C)   TempSrc: Oral   SpO2: 97%   Weight: 150 lb (68 kg)   Height: 5' 7\" (1.702 m)       Review of Systems   Constitutional: Negative for chills, fever and unexpected weight change. HENT: Negative for mouth sores, sore throat and voice change. Eyes: Negative for pain, discharge and itching. Respiratory: Negative for cough, shortness of breath and wheezing.     Cardiovascular: Negative for

## 2018-10-10 ENCOUNTER — HOSPITAL ENCOUNTER (OUTPATIENT)
Dept: VASCULAR LAB | Age: 53
Discharge: HOME | End: 2018-10-10
Payer: MEDICAID

## 2018-10-10 ENCOUNTER — HOSPITAL ENCOUNTER (OUTPATIENT)
Dept: NEUROLOGY | Age: 53
Discharge: HOME | End: 2018-10-10
Payer: MEDICAID

## 2018-10-10 PROCEDURE — 95909 NRV CNDJ TST 5-6 STUDIES: CPT

## 2018-10-10 PROCEDURE — 93922 UPR/L XTREMITY ART 2 LEVELS: CPT

## 2018-10-10 PROCEDURE — 95886 MUSC TEST DONE W/N TEST COMP: CPT

## 2018-11-15 ENCOUNTER — OFFICE VISIT (OUTPATIENT)
Dept: PULMONOLOGY | Age: 53
End: 2018-11-15
Payer: MEDICAID

## 2018-11-15 VITALS
HEART RATE: 76 BPM | DIASTOLIC BLOOD PRESSURE: 76 MMHG | TEMPERATURE: 98.5 F | OXYGEN SATURATION: 99 % | RESPIRATION RATE: 16 BRPM | HEIGHT: 67 IN | BODY MASS INDEX: 23.23 KG/M2 | WEIGHT: 148 LBS | SYSTOLIC BLOOD PRESSURE: 121 MMHG

## 2018-11-15 DIAGNOSIS — J45.50 ASTHMA, SEVERE PERSISTENT, WELL-CONTROLLED: Primary | ICD-10-CM

## 2018-11-15 PROCEDURE — 1036F TOBACCO NON-USER: CPT | Performed by: INTERNAL MEDICINE

## 2018-11-15 PROCEDURE — G8420 CALC BMI NORM PARAMETERS: HCPCS | Performed by: INTERNAL MEDICINE

## 2018-11-15 PROCEDURE — G8484 FLU IMMUNIZE NO ADMIN: HCPCS | Performed by: INTERNAL MEDICINE

## 2018-11-15 PROCEDURE — 3017F COLORECTAL CA SCREEN DOC REV: CPT | Performed by: INTERNAL MEDICINE

## 2018-11-15 PROCEDURE — 99213 OFFICE O/P EST LOW 20 MIN: CPT | Performed by: INTERNAL MEDICINE

## 2018-11-15 PROCEDURE — G8427 DOCREV CUR MEDS BY ELIG CLIN: HCPCS | Performed by: INTERNAL MEDICINE

## 2018-11-16 ASSESSMENT — ENCOUNTER SYMPTOMS
ABDOMINAL PAIN: 0
DIARRHEA: 0
CONSTIPATION: 0
VOICE CHANGE: 0
WHEEZING: 0
EYE DISCHARGE: 0
EYE ITCHING: 0
EYE PAIN: 0
SHORTNESS OF BREATH: 0
STRIDOR: 0
SORE THROAT: 0

## 2018-11-16 NOTE — PROGRESS NOTES
(PRILOSEC) 20 MG delayed release capsule, , Disp: , Rfl: 5    vitamin B-2 (RIBOFLAVIN) 100 MG TABS tablet, , Disp: , Rfl: 3    montelukast (SINGULAIR) 10 MG tablet, Take 10 mg by mouth nightly, Disp: , Rfl:     cetirizine (ZYRTEC) 10 MG tablet, Take 10 mg by mouth daily, Disp: , Rfl:     naloxegol (MOVANTIK) 25 MG TABS tablet, Take 25 mg by mouth every morning, Disp: , Rfl:     Multiple Vitamins-Minerals (THERAPEUTIC MULTIVITAMIN-MINERALS) tablet, Take 1 tablet by mouth daily, Disp: , Rfl:     Ascorbic Acid (VITAMIN C) 250 MG tablet, Take 1,000 mg by mouth daily, Disp: , Rfl:     estrogens, conjugated, (PREMARIN) 1.25 MG tablet, Take by mouth, Disp: , Rfl:     morphine (AVINZA) 60 MG extended release capsule, Take 60 mg by mouth ., Disp: , Rfl:     SUMAtriptan (IMITREX) 100 MG tablet, Take 200 mg by mouth once as needed. , Disp: , Rfl:     SUMAtriptan (IMITREX) 5 MG/ACT nasal spray, 1 spray by Nasal route daily as needed. , Disp: , Rfl:     quetiapine (SEROQUEL) 100 MG tablet, Take 50 mg by mouth nightly , Disp: , Rfl:     topiramate (TOPAMAX) 100 MG tablet, Take 200 mg by mouth daily. , Disp: , Rfl:     ibuprofen (IBU) 600 MG tablet, Take 1 tablet by mouth every 6 hours as needed for Pain. Take with food. (Patient taking differently: Take 800 mg by mouth every 6 hours as needed for Pain Take with food.), Disp: 20 tablet, Rfl: 0    Latex; Acyclovir; Astemizole; Dye fdc red 3 (erythrosine); Eggs [egg white]; Other; Penicillins; Sulfa antibiotics; Tobramycin-dexamethasone; and Codeine    Vitals:    11/15/18 1017 11/15/18 1018   BP:  121/76   Pulse:  76   Resp:  16   Temp:  98.5 °F (36.9 °C)   TempSrc:  Oral   SpO2:  99%   Weight:  148 lb (67.1 kg)   Height: 5' 7\" (1.702 m)        Review of Systems   Constitutional: Negative for chills, fever and unexpected weight change. HENT: Negative for mouth sores, sore throat and voice change. Eyes: Negative for pain, discharge and itching.    Respiratory: Negative

## 2018-12-03 ENCOUNTER — HOSPITAL ENCOUNTER (OUTPATIENT)
Age: 53
Discharge: HOME | End: 2018-12-03
Payer: MEDICAID

## 2018-12-03 PROCEDURE — 85025 COMPLETE CBC W/AUTO DIFF WBC: CPT

## 2018-12-03 PROCEDURE — 80053 COMPREHEN METABOLIC PANEL: CPT

## 2018-12-03 PROCEDURE — 36415 COLL VENOUS BLD VENIPUNCTURE: CPT

## 2018-12-04 LAB
A/G RATIO: 1.8 (ref 1.1–2.2)
ALBUMIN SERPL-MCNC: 4.4 G/DL (ref 3.4–5)
ALP BLD-CCNC: 62 U/L (ref 40–129)
ALT SERPL-CCNC: 23 U/L (ref 10–40)
ANION GAP SERPL CALCULATED.3IONS-SCNC: 11 MMOL/L (ref 3–16)
AST SERPL-CCNC: 26 U/L (ref 15–37)
BASOPHILS ABSOLUTE: 0 K/UL (ref 0–0.2)
BASOPHILS RELATIVE PERCENT: 0.7 %
BILIRUB SERPL-MCNC: <0.2 MG/DL (ref 0–1)
BUN BLDV-MCNC: 11 MG/DL (ref 7–20)
CALCIUM SERPL-MCNC: 9.5 MG/DL (ref 8.3–10.6)
CHLORIDE BLD-SCNC: 107 MMOL/L (ref 99–110)
CO2: 25 MMOL/L (ref 21–32)
CREAT SERPL-MCNC: 0.7 MG/DL (ref 0.6–1.1)
EOSINOPHILS ABSOLUTE: 0.1 K/UL (ref 0–0.6)
EOSINOPHILS RELATIVE PERCENT: 1.4 %
GFR AFRICAN AMERICAN: >60
GFR NON-AFRICAN AMERICAN: >60
GLOBULIN: 2.5 G/DL
GLUCOSE BLD-MCNC: 100 MG/DL (ref 70–99)
HCT VFR BLD CALC: 39.3 % (ref 36–48)
HEMOGLOBIN: 12.9 G/DL (ref 12–16)
LYMPHOCYTES ABSOLUTE: 1.7 K/UL (ref 1–5.1)
LYMPHOCYTES RELATIVE PERCENT: 36.2 %
MCH RBC QN AUTO: 29 PG (ref 26–34)
MCHC RBC AUTO-ENTMCNC: 33 G/DL (ref 31–36)
MCV RBC AUTO: 88 FL (ref 80–100)
MONOCYTES ABSOLUTE: 0.3 K/UL (ref 0–1.3)
MONOCYTES RELATIVE PERCENT: 7.2 %
NEUTROPHILS ABSOLUTE: 2.6 K/UL (ref 1.7–7.7)
NEUTROPHILS RELATIVE PERCENT: 54.5 %
PDW BLD-RTO: 13.2 % (ref 12.4–15.4)
PLATELET # BLD: 200 K/UL (ref 135–450)
PMV BLD AUTO: 10.4 FL (ref 5–10.5)
POTASSIUM SERPL-SCNC: 4.5 MMOL/L (ref 3.5–5.1)
RBC # BLD: 4.46 M/UL (ref 4–5.2)
SODIUM BLD-SCNC: 143 MMOL/L (ref 136–145)
TOTAL PROTEIN: 6.9 G/DL (ref 6.4–8.2)
WBC # BLD: 4.7 K/UL (ref 4–11)

## 2019-03-06 RX ORDER — LEVALBUTEROL TARTRATE 45 UG/1
AEROSOL, METERED ORAL
Qty: 1 INHALER | Refills: 11 | Status: SHIPPED | OUTPATIENT
Start: 2019-03-06 | End: 2020-04-09 | Stop reason: SDUPTHER

## 2019-04-02 ENCOUNTER — HOSPITAL ENCOUNTER (EMERGENCY)
Age: 54
Discharge: HOME | End: 2019-04-02
Attending: EMERGENCY MEDICINE
Payer: MEDICAID

## 2019-04-02 ENCOUNTER — APPOINTMENT (OUTPATIENT)
Dept: GENERAL RADIOLOGY | Age: 54
End: 2019-04-02
Payer: MEDICAID

## 2019-04-02 VITALS
BODY MASS INDEX: 24.11 KG/M2 | SYSTOLIC BLOOD PRESSURE: 106 MMHG | HEART RATE: 102 BPM | TEMPERATURE: 98.3 F | WEIGHT: 150 LBS | OXYGEN SATURATION: 96 % | RESPIRATION RATE: 16 BRPM | DIASTOLIC BLOOD PRESSURE: 67 MMHG | HEIGHT: 66 IN

## 2019-04-02 DIAGNOSIS — R51.9 PRESSURE IN HEAD: ICD-10-CM

## 2019-04-02 DIAGNOSIS — D72.819 LEUKOPENIA, UNSPECIFIED TYPE: ICD-10-CM

## 2019-04-02 DIAGNOSIS — D70.9 NEUTROPENIA, UNSPECIFIED TYPE (HCC): ICD-10-CM

## 2019-04-02 DIAGNOSIS — R07.89 CHEST TIGHTNESS: Primary | ICD-10-CM

## 2019-04-02 DIAGNOSIS — J18.9 PNEUMONIA OF LEFT LOWER LOBE DUE TO INFECTIOUS ORGANISM: ICD-10-CM

## 2019-04-02 LAB
A/G RATIO: 1.3 (ref 1.1–2.2)
ALBUMIN SERPL-MCNC: 3.8 G/DL (ref 3.4–5)
ALP BLD-CCNC: 50 U/L (ref 40–129)
ALT SERPL-CCNC: 18 U/L (ref 10–40)
ANION GAP SERPL CALCULATED.3IONS-SCNC: 9 MMOL/L (ref 3–16)
AST SERPL-CCNC: 34 U/L (ref 15–37)
BASOPHILS ABSOLUTE: 0 K/UL (ref 0–0.2)
BASOPHILS RELATIVE PERCENT: 1.4 %
BILIRUB SERPL-MCNC: <0.2 MG/DL (ref 0–1)
BUN BLDV-MCNC: 12 MG/DL (ref 7–20)
CALCIUM SERPL-MCNC: 8.7 MG/DL (ref 8.3–10.6)
CHLORIDE BLD-SCNC: 108 MMOL/L (ref 99–110)
CO2: 23 MMOL/L (ref 21–32)
CREAT SERPL-MCNC: 0.6 MG/DL (ref 0.6–1.1)
D DIMER: <200 NG/ML DDU (ref 0–229)
EKG ATRIAL RATE: 53 BPM
EKG DIAGNOSIS: NORMAL
EKG P AXIS: 72 DEGREES
EKG P-R INTERVAL: 148 MS
EKG Q-T INTERVAL: 466 MS
EKG QRS DURATION: 126 MS
EKG QTC CALCULATION (BAZETT): 437 MS
EKG R AXIS: 92 DEGREES
EKG T AXIS: 32 DEGREES
EKG VENTRICULAR RATE: 53 BPM
EOSINOPHILS ABSOLUTE: 0.1 K/UL (ref 0–0.6)
EOSINOPHILS RELATIVE PERCENT: 2.4 %
GFR AFRICAN AMERICAN: >60
GFR NON-AFRICAN AMERICAN: >60
GLOBULIN: 2.9 G/DL
GLUCOSE BLD-MCNC: 84 MG/DL (ref 70–99)
HCT VFR BLD CALC: 39.2 % (ref 36–48)
HEMOGLOBIN: 13.3 G/DL (ref 12–16)
LYMPHOCYTES ABSOLUTE: 1.3 K/UL (ref 1–5.1)
LYMPHOCYTES RELATIVE PERCENT: 41.1 %
MCH RBC QN AUTO: 29.9 PG (ref 26–34)
MCHC RBC AUTO-ENTMCNC: 33.8 G/DL (ref 31–36)
MCV RBC AUTO: 88.5 FL (ref 80–100)
MONOCYTES ABSOLUTE: 0.3 K/UL (ref 0–1.3)
MONOCYTES RELATIVE PERCENT: 9.2 %
NEUTROPHILS ABSOLUTE: 1.4 K/UL (ref 1.7–7.7)
NEUTROPHILS RELATIVE PERCENT: 45.9 %
PDW BLD-RTO: 13.2 % (ref 12.4–15.4)
PLATELET # BLD: 194 K/UL (ref 135–450)
PMV BLD AUTO: 9.4 FL (ref 5–10.5)
POTASSIUM SERPL-SCNC: 4.6 MMOL/L (ref 3.5–5.1)
PRO-BNP: 313 PG/ML (ref 0–124)
RAPID INFLUENZA  B AGN: NEGATIVE
RAPID INFLUENZA A AGN: NEGATIVE
RBC # BLD: 4.44 M/UL (ref 4–5.2)
SEDIMENTATION RATE, ERYTHROCYTE: 0 MM/HR (ref 0–30)
SODIUM BLD-SCNC: 140 MMOL/L (ref 136–145)
TOTAL PROTEIN: 6.7 G/DL (ref 6.4–8.2)
TROPONIN: <0.01 NG/ML
WBC # BLD: 3.1 K/UL (ref 4–11)

## 2019-04-02 PROCEDURE — 85025 COMPLETE CBC W/AUTO DIFF WBC: CPT

## 2019-04-02 PROCEDURE — 87804 INFLUENZA ASSAY W/OPTIC: CPT

## 2019-04-02 PROCEDURE — 6360000002 HC RX W HCPCS: Performed by: EMERGENCY MEDICINE

## 2019-04-02 PROCEDURE — 71046 X-RAY EXAM CHEST 2 VIEWS: CPT

## 2019-04-02 PROCEDURE — 2580000003 HC RX 258: Performed by: NURSE PRACTITIONER

## 2019-04-02 PROCEDURE — 6360000002 HC RX W HCPCS: Performed by: NURSE PRACTITIONER

## 2019-04-02 PROCEDURE — 36415 COLL VENOUS BLD VENIPUNCTURE: CPT

## 2019-04-02 PROCEDURE — 85652 RBC SED RATE AUTOMATED: CPT

## 2019-04-02 PROCEDURE — 96361 HYDRATE IV INFUSION ADD-ON: CPT

## 2019-04-02 PROCEDURE — 83880 ASSAY OF NATRIURETIC PEPTIDE: CPT

## 2019-04-02 PROCEDURE — 96374 THER/PROPH/DIAG INJ IV PUSH: CPT

## 2019-04-02 PROCEDURE — 84484 ASSAY OF TROPONIN QUANT: CPT

## 2019-04-02 PROCEDURE — 99284 EMERGENCY DEPT VISIT MOD MDM: CPT

## 2019-04-02 PROCEDURE — 93010 ELECTROCARDIOGRAM REPORT: CPT | Performed by: INTERNAL MEDICINE

## 2019-04-02 PROCEDURE — 80053 COMPREHEN METABOLIC PANEL: CPT

## 2019-04-02 PROCEDURE — 6370000000 HC RX 637 (ALT 250 FOR IP): Performed by: EMERGENCY MEDICINE

## 2019-04-02 PROCEDURE — 85379 FIBRIN DEGRADATION QUANT: CPT

## 2019-04-02 PROCEDURE — 96375 TX/PRO/DX INJ NEW DRUG ADDON: CPT

## 2019-04-02 PROCEDURE — 94640 AIRWAY INHALATION TREATMENT: CPT

## 2019-04-02 PROCEDURE — 6370000000 HC RX 637 (ALT 250 FOR IP): Performed by: NURSE PRACTITIONER

## 2019-04-02 PROCEDURE — 93005 ELECTROCARDIOGRAM TRACING: CPT | Performed by: EMERGENCY MEDICINE

## 2019-04-02 RX ORDER — IPRATROPIUM BROMIDE AND ALBUTEROL SULFATE 2.5; .5 MG/3ML; MG/3ML
1 SOLUTION RESPIRATORY (INHALATION) ONCE
Status: COMPLETED | OUTPATIENT
Start: 2019-04-02 | End: 2019-04-02

## 2019-04-02 RX ORDER — 0.9 % SODIUM CHLORIDE 0.9 %
1000 INTRAVENOUS SOLUTION INTRAVENOUS ONCE
Status: COMPLETED | OUTPATIENT
Start: 2019-04-02 | End: 2019-04-02

## 2019-04-02 RX ORDER — DEXAMETHASONE SODIUM PHOSPHATE 4 MG/ML
8 INJECTION, SOLUTION INTRA-ARTICULAR; INTRALESIONAL; INTRAMUSCULAR; INTRAVENOUS; SOFT TISSUE ONCE
Status: COMPLETED | OUTPATIENT
Start: 2019-04-02 | End: 2019-04-02

## 2019-04-02 RX ORDER — PREDNISONE 10 MG/1
40 TABLET ORAL DAILY
Qty: 16 TABLET | Refills: 0 | Status: SHIPPED | OUTPATIENT
Start: 2019-04-02 | End: 2019-04-06

## 2019-04-02 RX ORDER — DOXYCYCLINE HYCLATE 100 MG
100 TABLET ORAL 2 TIMES DAILY
Qty: 20 TABLET | Refills: 0 | Status: SHIPPED | OUTPATIENT
Start: 2019-04-02 | End: 2019-04-12

## 2019-04-02 RX ORDER — DIPHENHYDRAMINE HYDROCHLORIDE 50 MG/ML
12.5 INJECTION INTRAMUSCULAR; INTRAVENOUS ONCE
Status: COMPLETED | OUTPATIENT
Start: 2019-04-02 | End: 2019-04-02

## 2019-04-02 RX ORDER — KETOROLAC TROMETHAMINE 30 MG/ML
30 INJECTION, SOLUTION INTRAMUSCULAR; INTRAVENOUS ONCE
Status: COMPLETED | OUTPATIENT
Start: 2019-04-02 | End: 2019-04-02

## 2019-04-02 RX ORDER — METOCLOPRAMIDE HYDROCHLORIDE 5 MG/ML
10 INJECTION INTRAMUSCULAR; INTRAVENOUS ONCE
Status: COMPLETED | OUTPATIENT
Start: 2019-04-02 | End: 2019-04-02

## 2019-04-02 RX ORDER — DOXYCYCLINE HYCLATE 100 MG
100 TABLET ORAL ONCE
Status: COMPLETED | OUTPATIENT
Start: 2019-04-02 | End: 2019-04-02

## 2019-04-02 RX ORDER — ACETAMINOPHEN 325 MG/1
650 TABLET ORAL ONCE
Status: COMPLETED | OUTPATIENT
Start: 2019-04-02 | End: 2019-04-02

## 2019-04-02 RX ADMIN — DEXAMETHASONE SODIUM PHOSPHATE 8 MG: 4 INJECTION, SOLUTION INTRAMUSCULAR; INTRAVENOUS at 16:17

## 2019-04-02 RX ADMIN — IPRATROPIUM BROMIDE AND ALBUTEROL SULFATE 1 AMPULE: .5; 3 SOLUTION RESPIRATORY (INHALATION) at 16:19

## 2019-04-02 RX ADMIN — SODIUM CHLORIDE 1000 ML: 9 INJECTION, SOLUTION INTRAVENOUS at 15:14

## 2019-04-02 RX ADMIN — IPRATROPIUM BROMIDE AND ALBUTEROL SULFATE 1 AMPULE: .5; 3 SOLUTION RESPIRATORY (INHALATION) at 14:56

## 2019-04-02 RX ADMIN — DOXYCYCLINE HYCLATE 100 MG: 100 TABLET, COATED ORAL at 16:13

## 2019-04-02 RX ADMIN — DIPHENHYDRAMINE HYDROCHLORIDE 12.5 MG: 50 INJECTION, SOLUTION INTRAMUSCULAR; INTRAVENOUS at 16:17

## 2019-04-02 RX ADMIN — ACETAMINOPHEN 650 MG: 325 TABLET ORAL at 16:12

## 2019-04-02 RX ADMIN — METOCLOPRAMIDE HYDROCHLORIDE 10 MG: 5 INJECTION INTRAMUSCULAR; INTRAVENOUS at 16:44

## 2019-04-02 RX ADMIN — KETOROLAC TROMETHAMINE 30 MG: 30 INJECTION, SOLUTION INTRAMUSCULAR; INTRAVENOUS at 15:23

## 2019-04-02 ASSESSMENT — PAIN DESCRIPTION - PAIN TYPE
TYPE: ACUTE PAIN
TYPE: ACUTE PAIN

## 2019-04-02 ASSESSMENT — PAIN SCALES - GENERAL
PAINLEVEL_OUTOF10: 9
PAINLEVEL_OUTOF10: 8
PAINLEVEL_OUTOF10: 9
PAINLEVEL_OUTOF10: 8
PAINLEVEL_OUTOF10: 9

## 2019-04-02 ASSESSMENT — HEART SCORE: ECG: 0

## 2019-04-02 ASSESSMENT — PAIN DESCRIPTION - DESCRIPTORS
DESCRIPTORS: TIGHTNESS
DESCRIPTORS: POUNDING

## 2019-04-02 ASSESSMENT — PAIN DESCRIPTION - LOCATION
LOCATION: HEAD
LOCATION: CHEST
LOCATION: CHEST

## 2019-04-02 NOTE — ED NOTES
Patient given Prednisone and Doxycycline scripts. Patient instructed to take as directed. Patient instructed to follow up with Dr. Jayashree Mccabe as soon as possible for a visit in two days. Patient verbalized understanding of d/c instructions. Patient ambulated from the ER without difficulty alone.      Peyman Valdivia LPN  48/57/15 8601

## 2019-04-02 NOTE — ED NOTES
Influenza swab collected from right nare and sent to lab. Patient tolerated well.      Alissa Chamberlain, JOSELUIS  49/05/66 4503

## 2019-04-02 NOTE — ED PROVIDER NOTES
[facility] Emergency Department    CHIEF COMPLAINT  Headache (states she went to urgent care today thinking her asthma was acting up. c/o headache, fatigue, fainted in shower 2 weeks ago. states she has had chest tightness since the weekend. states urgent care sent her here for heart labs.) and Fatigue      HISTORY OF PRESENT ILLNESS  Duke Escalera is a 46 y.o. female who presents to the ED complaining of chest tightness, shortness of breath, headache and overall weakness since Friday. Patient reports that she woke up with symptoms and has been constant since then. Patient has history of asthma and reports that she's been using her rescue inhaler more than usual and is out of her nebulizer treatments. Patient reports calling pulmonologist office for refills and went to urgent care for evaluation. She reports that she was sent to ER for treatment and evaluation dizziness or lightheadedness. No known fever or chills. No abdominal pain or discomfort. No nausea, vomiting, or diarrhea. No urinary urgency, frequency, retention, dysuria, or hematuria. No numbness or tingling in extremity. No unilateral weakness. Patient reports that her symptoms have been constant and worse at night. Patient reports that chest tightness and shortness of breath is worse upon exertion. No Pain or tenderness. No recent surgeries or long car rides. Patient reports that 2 weeks ago she had a syncopal episode while in the shower but denies seeing any medical treatment at that time. Patient reports that she was fine afterwards until Friday. No other complaints, modifying factors or associated symptoms. Nursing notes reviewed.    Past Medical History:   Diagnosis Date    Asthma     Back pain     Headache     Neck pain      Past Surgical History:   Procedure Laterality Date    FOOT SURGERY      right    HIP SURGERY      HYSTERECTOMY      NECK SURGERY      NECK SURGERY       Family History   Problem Relation Age of Onset    High Blood Pressure Father     Heart Disease Father         aorta ruptured    Heart Disease Maternal Grandmother         pacemaker     Social History     Socioeconomic History    Marital status:      Spouse name: Not on file    Number of children: Not on file    Years of education: Not on file    Highest education level: Not on file   Occupational History    Not on file   Social Needs    Financial resource strain: Not on file    Food insecurity:     Worry: Not on file     Inability: Not on file    Transportation needs:     Medical: Not on file     Non-medical: Not on file   Tobacco Use    Smoking status: Former Smoker     Packs/day: 0.50     Years: 10.00     Pack years: 5.00     Types: Cigarettes     Last attempt to quit: 2007     Years since quittin.2    Smokeless tobacco: Never Used   Substance and Sexual Activity    Alcohol use: Not Currently    Drug use: Never    Sexual activity: Not on file   Lifestyle    Physical activity:     Days per week: Not on file     Minutes per session: Not on file    Stress: Not on file   Relationships    Social connections:     Talks on phone: Not on file     Gets together: Not on file     Attends Yarsani service: Not on file     Active member of club or organization: Not on file     Attends meetings of clubs or organizations: Not on file     Relationship status: Not on file    Intimate partner violence:     Fear of current or ex partner: Not on file     Emotionally abused: Not on file     Physically abused: Not on file     Forced sexual activity: Not on file   Other Topics Concern    Not on file   Social History Narrative    Not on file     No current facility-administered medications for this encounter.       Current Outpatient Medications   Medication Sig Dispense Refill    ipratropium (ATROVENT) 0.02 % nebulizer solution Take 2.5 mLs by nebulization 4 times daily 2.5 mL 3    levalbuterol (XOPENEX HFA) 45 MCG/ACT inhaler INHALE ONE TO TWO PUFFS BY MOUTH EVERY 4 HOURS AS NEEDED FOR WHEEZING 1 Inhaler 11    MAGNESIUM PO Take by mouth      Potassium 75 MG TABS Take by mouth      tiotropium (SPIRIVA) 18 MCG inhalation capsule Inhale 18 mcg into the lungs      tiZANidine (ZANAFLEX) 4 MG tablet       omeprazole (PRILOSEC) 20 MG delayed release capsule   5    vitamin B-2 (RIBOFLAVIN) 100 MG TABS tablet   3    montelukast (SINGULAIR) 10 MG tablet Take 10 mg by mouth nightly      cetirizine (ZYRTEC) 10 MG tablet Take 10 mg by mouth daily      naloxegol (MOVANTIK) 25 MG TABS tablet Take 25 mg by mouth every morning      Multiple Vitamins-Minerals (THERAPEUTIC MULTIVITAMIN-MINERALS) tablet Take 1 tablet by mouth daily      Ascorbic Acid (VITAMIN C) 250 MG tablet Take 1,000 mg by mouth daily      estrogens, conjugated, (PREMARIN) 1.25 MG tablet Take by mouth      morphine (AVINZA) 60 MG extended release capsule Take 60 mg by mouth .  SUMAtriptan (IMITREX) 100 MG tablet Take 200 mg by mouth once as needed.  SUMAtriptan (IMITREX) 5 MG/ACT nasal spray 1 spray by Nasal route daily as needed.  quetiapine (SEROQUEL) 100 MG tablet Take 50 mg by mouth nightly       topiramate (TOPAMAX) 100 MG tablet Take 200 mg by mouth daily.  ibuprofen (IBU) 600 MG tablet Take 1 tablet by mouth every 6 hours as needed for Pain. Take with food.  (Patient taking differently: Take 800 mg by mouth every 6 hours as needed for Pain Take with food.) 20 tablet 0     Allergies   Allergen Reactions    Latex Swelling and Rash    Acyclovir Swelling    Astemizole      Other reaction(s): Vomiting    Dye Fdc Red 3 (Erythrosine) Other (See Comments)     Seizure with Myelogram dye per pt    Eggs [Egg White]     Other     Penicillins Hives    Sulfa Antibiotics Hives    Tobramycin-Dexamethasone     Codeine Nausea And Vomiting     Other reaction(s): Vomiting       REVIEW OF SYSTEMS  10 systems reviewed, pertinent positives per HPI otherwise noted to be negative    PHYSICAL EXAM  /78   Pulse 65   Temp 98 °F (36.7 °C) (Oral)   Resp 17   Ht 5' 6\" (1.676 m)   Wt 150 lb (68 kg)   SpO2 98%   BMI 24.21 kg/m²   GENERAL APPEARANCE: Awake and alert. Cooperative. No acute distress. Non toxic inappearance, speech is clear and patient answer questions appropriately. HEAD: Normocephalic. Atraumatic. EYES: PERRL. EOM's grossly intact. ENT: Mucous membranes are pink and moist. Nares unobstructed. TMs intact clear bilaterally. NECK: Supple. HEART: RRR. No murmurs. LUNGS: Respirations unlabored. CTAB. Good air exchange. Speaking comfortably in full sentences. No wheezes, rhonchi, rales or crackles. ABDOMEN: Soft. Non-distended. Non-tender. No guarding or rebound. Negative rigidity. EXTREMITIES: No peripheral edema. Moves all extremities equally. All extremities neurovascularly intact. Brisk cap refill.pulses palpable bilateral radial/post tib +2/2 equal bilaterally. No unilateral weakness. Equal grasps bilaterally. SKIN: Warm and dry. No acute rashes, lesions or ecchymosis. NEUROLOGICAL: Alert and oriented. No focal/motor deficits. No gross facial drooping. Strength 5/5, sensation intact. PSYCHIATRIC: Normal mood and affect. RADIOLOGY  No results found. ED COURSE   I have evaluated this patient in collaboration with Dr Salud Winter. Vital signs stable. EKG performed and interpreted by Dr. Salud Winter. Patient was given Toradol with normal saline bolus upon arrival. Patient also given DuoNeb breathing treatments ×3. Patient continued to have headache therefore additional medications ordered at that time. Patient given Tylenol, Benadryl, Reglan, and Decadron . Rapid influenza negative. CBC revealed leukopenia at 3.1 otherwise noted to findings. CMP unremarkable. Troponin negative at <0.01. DDimer <200. .  radiological imaging of the chest revealed left basilar atelectasis, granulomatous disease per radiologist. Patient was given doxycycline per ER attending. Patient  care independently transferred to Dr. Tonia Kulkarni from this point forward in care for further ED treatment, medical decision-making, and disposition. See Dr. Roselia Perdomo for further documentation.     Vidhya Menjivar, APRN - CNP  04/02/19 1649    Results for orders placed or performed during the hospital encounter of 04/02/19   Rapid influenza A/B antigens   Result Value Ref Range    Rapid Influenza A Ag Negative Negative    Rapid Influenza B Ag Negative Negative   CBC Auto Differential   Result Value Ref Range    WBC 3.1 (L) 4.0 - 11.0 K/uL    RBC 4.44 4.00 - 5.20 M/uL    Hemoglobin 13.3 12.0 - 16.0 g/dL    Hematocrit 39.2 36.0 - 48.0 %    MCV 88.5 80.0 - 100.0 fL    MCH 29.9 26.0 - 34.0 pg    MCHC 33.8 31.0 - 36.0 g/dL    RDW 13.2 12.4 - 15.4 %    Platelets 412 024 - 766 K/uL    MPV 9.4 5.0 - 10.5 fL    Neutrophils % 45.9 %    Lymphocytes % 41.1 %    Monocytes % 9.2 %    Eosinophils % 2.4 %    Basophils % 1.4 %    Neutrophils # 1.4 (L) 1.7 - 7.7 K/uL    Lymphocytes # 1.3 1.0 - 5.1 K/uL    Monocytes # 0.3 0.0 - 1.3 K/uL    Eosinophils # 0.1 0.0 - 0.6 K/uL    Basophils # 0.0 0.0 - 0.2 K/uL   Comprehensive Metabolic Panel   Result Value Ref Range    Sodium 140 136 - 145 mmol/L    Potassium 4.6 3.5 - 5.1 mmol/L    Chloride 108 99 - 110 mmol/L    CO2 23 21 - 32 mmol/L    Anion Gap 9 3 - 16    Glucose 84 70 - 99 mg/dL    BUN 12 7 - 20 mg/dL    CREATININE 0.6 0.6 - 1.1 mg/dL    GFR Non-African American >60 >60    GFR African American >60 >60    Calcium 8.7 8.3 - 10.6 mg/dL    Total Protein 6.7 6.4 - 8.2 g/dL    Alb 3.8 3.4 - 5.0 g/dL    Albumin/Globulin Ratio 1.3 1.1 - 2.2    Total Bilirubin <0.2 0.0 - 1.0 mg/dL    Alkaline Phosphatase 50 40 - 129 U/L    ALT 18 10 - 40 U/L    AST 34 15 - 37 U/L    Globulin 2.9 g/dL   Troponin   Result Value Ref Range    Troponin <0.01 <0.01 ng/mL   D-dimer, quantitative   Result Value Ref Range    D-Dimer, Quant <200 0 - 229 ng/mL DDU   Brain Natriuretic Peptide   Result Value Ref Range    Pro- (H) 0 - 124 pg/mL   Sedimentation Rate   Result Value Ref Range    Sed Rate 0 0 - 30 mm/Hr   EKG 12 Lead   Result Value Ref Range    Ventricular Rate 53 BPM    Atrial Rate 53 BPM    P-R Interval 148 ms    QRS Duration 126 ms    Q-T Interval 466 ms    QTc Calculation (Bazett) 437 ms    P Axis 72 degrees    R Axis 92 degrees    T Axis 32 degrees    Diagnosis       Sinus bradycardiaRight bundle branch blockAbnormal ECGWhen compared with ECG of 07-OCT-2016 10:23,No significant change was foundConfirmed by [facility] GISELLE HALL MD ([id number]) on 4/2/2019 4:45:25 PM       I estimate there is LOW risk for PULMONARY EMBOLISM, ACUTE CORONARY SYNDROME, OR THORACIC AORTIC DISSECTION, thus I consider the discharge disposition reasonable. Cherelle Mccloud and I have discussed the diagnosis and risks, and we agree with discharging home to follow-up with their primary doctor. We also discussed returning to the Emergency Department immediately if new or worsening symptoms occur. We have discussed the symptoms which are most concerning (e.g., bloody sputum, fever, worsening pain or shortness of breath, vomiting) that necessitate immediate return. FINAL Impression    1. Chest tightness    2. Pneumonia of left lower lobe due to infectious organism ([address]    3. Pressure in head    4. Leukopenia, unspecified type    5. Neutropenia, unspecified type (HCC)        Blood pressure 106/67, pulse 102, temperature 98.3 °F (36.8 °C), temperature source Oral, resp. rate 16, height 5' 6\" (1.676 m), weight 150 lb (68 kg), SpO2 96 %, not currently breastfeeding.        Britni Mccormack, BRIGTETE - AMAYA  04/03/19 [id number]

## 2019-04-05 ENCOUNTER — OFFICE VISIT (OUTPATIENT)
Dept: PULMONOLOGY | Age: 54
End: 2019-04-05
Payer: MEDICAID

## 2019-04-05 VITALS
WEIGHT: 152 LBS | DIASTOLIC BLOOD PRESSURE: 73 MMHG | BODY MASS INDEX: 24.43 KG/M2 | OXYGEN SATURATION: 97 % | HEIGHT: 66 IN | HEART RATE: 81 BPM | RESPIRATION RATE: 18 BRPM | SYSTOLIC BLOOD PRESSURE: 114 MMHG

## 2019-04-05 DIAGNOSIS — J41.0 SIMPLE CHRONIC BRONCHITIS (HCC): ICD-10-CM

## 2019-04-05 DIAGNOSIS — J98.11 ATELECTASIS: ICD-10-CM

## 2019-04-05 DIAGNOSIS — R06.02 SOB (SHORTNESS OF BREATH): ICD-10-CM

## 2019-04-05 DIAGNOSIS — J98.4 RESTRICTIVE LUNG DISEASE: ICD-10-CM

## 2019-04-05 PROCEDURE — G8427 DOCREV CUR MEDS BY ELIG CLIN: HCPCS | Performed by: INTERNAL MEDICINE

## 2019-04-05 PROCEDURE — 3023F SPIROM DOC REV: CPT | Performed by: INTERNAL MEDICINE

## 2019-04-05 PROCEDURE — 99214 OFFICE O/P EST MOD 30 MIN: CPT | Performed by: INTERNAL MEDICINE

## 2019-04-05 PROCEDURE — 1036F TOBACCO NON-USER: CPT | Performed by: INTERNAL MEDICINE

## 2019-04-05 PROCEDURE — G8420 CALC BMI NORM PARAMETERS: HCPCS | Performed by: INTERNAL MEDICINE

## 2019-04-05 PROCEDURE — G8926 SPIRO NO PERF OR DOC: HCPCS | Performed by: INTERNAL MEDICINE

## 2019-04-05 PROCEDURE — 3017F COLORECTAL CA SCREEN DOC REV: CPT | Performed by: INTERNAL MEDICINE

## 2019-04-05 RX ORDER — BUDESONIDE AND FORMOTEROL FUMARATE DIHYDRATE 160; 4.5 UG/1; UG/1
2 AEROSOL RESPIRATORY (INHALATION) 2 TIMES DAILY
Qty: 2 INHALER | Refills: 0 | COMMUNITY
Start: 2019-04-05 | End: 2019-10-17 | Stop reason: CLARIF

## 2019-04-05 NOTE — PROGRESS NOTES
sounds present. No distension or hernia. No tenderness. Musculoskeletal: No cyanosis. No clubbing. No obvious joint deformity. Lymphadenopathy: No cervical or supraclavicularadenopathy. Skin: Skin is warm and dry. No rash or nodules on the exposed extremities. Psychiatric: Normal mood and affect. Behavior is normal.  slightly  anxiety. Neurologic: Alert, awake and oriented. PERRL. Speechfluent      Data:    Imaging:  I have reviewed radiology images personally. No orders to display     Xr Chest Standard (2 Vw)    Result Date: 4/2/2019  EXAMINATION: TWO VIEWS OF THE CHEST 4/2/2019 1:26 pm COMPARISON: 02/15/2018 HISTORY: ORDERING SYSTEM PROVIDED HISTORY: chest tightness TECHNOLOGIST PROVIDED HISTORY: Reason for exam:->chest tightness Ordering Physician Provided Reason for Exam: chest tightness Acuity: Acute Type of Exam: Initial FINDINGS: Cervical fusion hardware. Dense nodule at the lateral right upper to mid lung, similar to prior. Linear opacity at the left base, likely atelectasis. No pleural effusion. No gross pneumothorax. Cardiac and mediastinal silhouettes are similar to prior. Left basilar atelectasis. Sequela of granulomatous disease. Assessment:    1. SOB (shortness of breath)    - budesonide-formoterol (SYMBICORT) 160-4.5 MCG/ACT AERO; Inhale 2 puffs into the lungs 2 times daily  Dispense: 2 Inhaler; Refill: 0    2. Simple chronic bronchitis (HCC)    - budesonide-formoterol (SYMBICORT) 160-4.5 MCG/ACT AERO; Inhale 2 puffs into the lungs 2 times daily  Dispense: 2 Inhaler; Refill: 0    3. Restrictive lung disease      4.  Atelectasis          Plan:   · Patient was told about the clinical findings including auscultation and implications  · Patient was shown the x-ray chest along with findings and differential diagnoses and implications  · Patient did not have any fever or leukocytosis on that day after evaluation the ER and it appears that it may be more of atelectasis as a pneumonia  · Patient was told about the pathophysiology of the disease process and its modifying factors  · Patient was told to continue with Spiriva inhaler once a day  · Patient to take albuterol inhaler 2 puffs every 6 on whenever necessary basis  · Patient gives history of recurrent bronchitis-patient was told to try some Symbicort 160/4.52 puffs twice a day and to rinse mouth with water after use to prevent any hoarseness of voice or oral thrush and patient has used the Symbicort and the past without any side effects  · Patient was told to do some breathing exercises  · Patient can continue with doxycycline for now but is not sure that patient needs for long time  · Patient had a sleep study done which did not show any sleep apnea  · Patient's PFT results were discussed  · If Symbicort is helping the patient then patient needs to call for prescription

## 2019-04-05 NOTE — LETTER
[facility]  [address] [place] [address] Naveed Collins  Phone: [phone]  Fax: [phone]    Gagan Verma MD        April 5, 2019     Patient: Dipesh Lange   YOB: 1965   Date of Visit: 4/5/2019       To Whom it May Concern:    Geovani Rubio was seen in my clinic on 4/5/2019. Also please excuse Suni Reyes on 4/4/2019. She may return to work on 4/8/2019. If you have any questions or concerns, please don't hesitate to call.     Sincerely,             Gagan Verma MD

## 2019-04-16 ENCOUNTER — OFFICE VISIT (OUTPATIENT)
Dept: PULMONOLOGY | Age: 54
End: 2019-04-16
Payer: MEDICAID

## 2019-04-16 VITALS
OXYGEN SATURATION: 99 % | BODY MASS INDEX: 24.04 KG/M2 | DIASTOLIC BLOOD PRESSURE: 68 MMHG | SYSTOLIC BLOOD PRESSURE: 116 MMHG | HEART RATE: 86 BPM | HEIGHT: 67 IN | WEIGHT: 153.2 LBS

## 2019-04-16 DIAGNOSIS — J45.40 ASTHMA, MODERATE PERSISTENT, POORLY-CONTROLLED: Primary | ICD-10-CM

## 2019-04-16 PROCEDURE — 99213 OFFICE O/P EST LOW 20 MIN: CPT | Performed by: INTERNAL MEDICINE

## 2019-04-16 PROCEDURE — 1036F TOBACCO NON-USER: CPT | Performed by: INTERNAL MEDICINE

## 2019-04-16 PROCEDURE — 3017F COLORECTAL CA SCREEN DOC REV: CPT | Performed by: INTERNAL MEDICINE

## 2019-04-16 PROCEDURE — G8427 DOCREV CUR MEDS BY ELIG CLIN: HCPCS | Performed by: INTERNAL MEDICINE

## 2019-04-16 PROCEDURE — G8420 CALC BMI NORM PARAMETERS: HCPCS | Performed by: INTERNAL MEDICINE

## 2019-04-16 RX ORDER — IBUPROFEN 800 MG/1
TABLET ORAL
COMMUNITY
Start: 2019-04-15 | End: 2019-10-17 | Stop reason: CLARIF

## 2019-04-16 ASSESSMENT — ENCOUNTER SYMPTOMS
VOICE CHANGE: 0
CHOKING: 0
CONSTIPATION: 0
WHEEZING: 0
EYE ITCHING: 0
ABDOMINAL PAIN: 0
STRIDOR: 0
SORE THROAT: 0
EYE DISCHARGE: 0
DIARRHEA: 0
EYE PAIN: 0

## 2019-04-16 NOTE — PROGRESS NOTES
Pulmonary Outpatient Note   Brynn Karimi MD       2019    Chief Complaint:  Asthma     HPI:   46y.o. year old female here for follow up regarding asthma. Recently treated for an acute respiratory infection, seen in the ED and then seen by Dr. Dede Velasquez. Had been treated with doxy. When seen last she was encouraged to resume Symbicort but did not due to concern for adverse effects including thrush when used last.   States that her symptoms have been improving today. Going to see allergy tomorrow.      Past Medical History:   Diagnosis Date    Asthma     Back pain     Headache     Neck pain        Past Surgical History:   Procedure Laterality Date    FOOT SURGERY      right    HIP SURGERY      HYSTERECTOMY      NECK SURGERY      NECK SURGERY         Social History     Tobacco Use    Smoking status: Former Smoker     Packs/day: 0.50     Years: 10.00     Pack years: 5.00     Types: Cigarettes     Last attempt to quit: 2007     Years since quittin.2    Smokeless tobacco: Never Used   Substance Use Topics    Alcohol use: Not Currently          Family History   Problem Relation Age of Onset    High Blood Pressure Father     Heart Disease Father         aorta ruptured    Heart Disease Maternal Grandmother         pacemaker         Current Outpatient Medications:     ibuprofen (ADVIL;MOTRIN) 800 MG tablet, , Disp: , Rfl:     budesonide-formoterol (SYMBICORT) 160-4.5 MCG/ACT AERO, Inhale 2 puffs into the lungs 2 times daily, Disp: 2 Inhaler, Rfl: 0    ipratropium (ATROVENT) 0.02 % nebulizer solution, Take 2.5 mLs by nebulization 4 times daily, Disp: 2.5 mL, Rfl: 3    levalbuterol (XOPENEX HFA) 45 MCG/ACT inhaler, INHALE ONE TO TWO PUFFS BY MOUTH EVERY 4 HOURS AS NEEDED FOR WHEEZING, Disp: 1 Inhaler, Rfl: 11    tiotropium (SPIRIVA) 18 MCG inhalation capsule, Inhale 18 mcg into the lungs, Disp: , Rfl:     tiZANidine (ZANAFLEX) 4 MG tablet, , Disp: , Rfl:     omeprazole (PRILOSEC) 20 MG delayed release capsule, , Disp: , Rfl: 5    montelukast (SINGULAIR) 10 MG tablet, Take 10 mg by mouth nightly, Disp: , Rfl:     cetirizine (ZYRTEC) 10 MG tablet, Take 10 mg by mouth daily, Disp: , Rfl:     naloxegol (MOVANTIK) 25 MG TABS tablet, Take 25 mg by mouth every morning, Disp: , Rfl:     Multiple Vitamins-Minerals (THERAPEUTIC MULTIVITAMIN-MINERALS) tablet, Take 1 tablet by mouth daily, Disp: , Rfl:     Ascorbic Acid (VITAMIN C) 250 MG tablet, Take 1,000 mg by mouth daily, Disp: , Rfl:     estrogens, conjugated, (PREMARIN) 1.25 MG tablet, Take by mouth, Disp: , Rfl:     morphine (AVINZA) 60 MG extended release capsule, Take 60 mg by mouth ., Disp: , Rfl:     SUMAtriptan (IMITREX) 100 MG tablet, Take 200 mg by mouth once as needed. , Disp: , Rfl:     SUMAtriptan (IMITREX) 5 MG/ACT nasal spray, 1 spray by Nasal route daily as needed. , Disp: , Rfl:     quetiapine (SEROQUEL) 100 MG tablet, Take 50 mg by mouth nightly , Disp: , Rfl:     topiramate (TOPAMAX) 100 MG tablet, Take 200 mg by mouth daily. , Disp: , Rfl:     tiotropium (SPIRIVA RESPIMAT) 2.5 MCG/ACT AERS inhaler, Inhale 2 puffs into the lungs daily, Disp: 2 Inhaler, Rfl: 0    MAGNESIUM PO, Take by mouth, Disp: , Rfl:     Potassium 75 MG TABS, Take by mouth, Disp: , Rfl:     vitamin B-2 (RIBOFLAVIN) 100 MG TABS tablet, , Disp: , Rfl: 3    ibuprofen (IBU) 600 MG tablet, Take 1 tablet by mouth every 6 hours as needed for Pain. Take with food. (Patient taking differently: Take 800 mg by mouth every 6 hours as needed for Pain Take with food.), Disp: 20 tablet, Rfl: 0    Latex; Acyclovir; Astemizole; Eggs [egg white]; Food color pink; Other; Penicillins; Sulfa antibiotics;  Tobramycin-dexamethasone; and Codeine    Vitals:    04/16/19 1014 04/16/19 1026   BP:  116/68   Site:  Left Upper Arm   Position:  Sitting   Cuff Size:  Medium Adult   Pulse:  86   SpO2:  99%   Weight:  153 lb 3.2 oz (69.5 kg)   Height: 5' 7\" (1.702 m) 5' 7.2\" (1.707 m) Review of Systems   Constitutional: Negative for chills, fever and unexpected weight change. HENT: Negative for mouth sores, sore throat and voice change. Eyes: Negative for pain, discharge and itching. Respiratory: Negative for choking, wheezing and stridor. Cardiovascular: Negative for chest pain, palpitations and leg swelling. Gastrointestinal: Negative for abdominal pain, constipation and diarrhea. Endocrine: Negative for cold intolerance, heat intolerance and polydipsia. Genitourinary: Negative for dysuria, frequency and hematuria. Musculoskeletal: Negative for gait problem, joint swelling and neck stiffness. Neurological: Negative for dizziness, numbness and headaches. Psychiatric/Behavioral: Negative for agitation, confusion and hallucinations. Physical Exam   Constitutional: She appears well-developed and well-nourished. No distress. HENT:   Head: Normocephalic and atraumatic. Mouth/Throat: Oropharynx is clear and moist. No oropharyngeal exudate. Eyes: Pupils are equal, round, and reactive to light. EOM are normal.   Neck: Neck supple. No JVD present. Cardiovascular: Normal heart sounds. Exam reveals no gallop and no friction rub. No murmur heard. Pulmonary/Chest: Effort normal. She has no wheezes. She has no rales. Equal chest rise and expansion bilaterally   Abdominal: Soft. Bowel sounds are normal. She exhibits no distension. There is no tenderness. Musculoskeletal: Normal range of motion. She exhibits no edema. Lymphadenopathy:     She has no cervical adenopathy. Neurological: She is alert. No cranial nerve deficit. CN 2-12 grossly intact   Skin: Skin is warm and dry. No rash noted. She is not diaphoretic. ASSESSMENT:    1.  Asthma, moderate persistent, poorly-controlled      PLAN:    -Continue Spiriva, hold off on Symbicort  -If symptoms do not completely improve or return will plan for bronchoscopy for directed specimen collection and

## 2019-05-21 ENCOUNTER — HOSPITAL ENCOUNTER (OUTPATIENT)
Dept: MAMMOGRAPHY | Age: 54
Discharge: HOME | End: 2019-05-21
Payer: MEDICAID

## 2019-05-21 DIAGNOSIS — Z12.39 BREAST CANCER SCREENING: ICD-10-CM

## 2019-05-21 PROCEDURE — 77063 BREAST TOMOSYNTHESIS BI: CPT

## 2019-10-17 ENCOUNTER — OFFICE VISIT (OUTPATIENT)
Dept: PULMONOLOGY | Age: 54
End: 2019-10-17
Payer: MEDICAID

## 2019-10-17 ENCOUNTER — TELEPHONE (OUTPATIENT)
Dept: PULMONOLOGY | Age: 54
End: 2019-10-17

## 2019-10-17 VITALS
SYSTOLIC BLOOD PRESSURE: 110 MMHG | HEART RATE: 64 BPM | DIASTOLIC BLOOD PRESSURE: 73 MMHG | TEMPERATURE: 98.2 F | WEIGHT: 155 LBS | HEIGHT: 66 IN | OXYGEN SATURATION: 96 % | BODY MASS INDEX: 24.91 KG/M2 | RESPIRATION RATE: 16 BRPM

## 2019-10-17 DIAGNOSIS — J45.50 ASTHMA, SEVERE PERSISTENT, WELL-CONTROLLED: Primary | ICD-10-CM

## 2019-10-17 PROCEDURE — 99213 OFFICE O/P EST LOW 20 MIN: CPT | Performed by: INTERNAL MEDICINE

## 2019-10-17 PROCEDURE — G8419 CALC BMI OUT NRM PARAM NOF/U: HCPCS | Performed by: INTERNAL MEDICINE

## 2019-10-17 PROCEDURE — G8482 FLU IMMUNIZE ORDER/ADMIN: HCPCS | Performed by: INTERNAL MEDICINE

## 2019-10-17 PROCEDURE — 1036F TOBACCO NON-USER: CPT | Performed by: INTERNAL MEDICINE

## 2019-10-17 PROCEDURE — 3017F COLORECTAL CA SCREEN DOC REV: CPT | Performed by: INTERNAL MEDICINE

## 2019-10-17 PROCEDURE — G8427 DOCREV CUR MEDS BY ELIG CLIN: HCPCS | Performed by: INTERNAL MEDICINE

## 2019-10-24 ASSESSMENT — ENCOUNTER SYMPTOMS
ABDOMINAL PAIN: 0
VOICE CHANGE: 0
CHOKING: 0
STRIDOR: 0
EYE DISCHARGE: 0
CONSTIPATION: 0
CHEST TIGHTNESS: 0
SORE THROAT: 0
EYE ITCHING: 0
DIARRHEA: 0
EYE PAIN: 0

## 2019-11-27 ENCOUNTER — OFFICE VISIT (OUTPATIENT)
Dept: PULMONOLOGY | Age: 54
End: 2019-11-27
Payer: MEDICAID

## 2019-11-27 PROCEDURE — G8482 FLU IMMUNIZE ORDER/ADMIN: HCPCS | Performed by: INTERNAL MEDICINE

## 2019-11-27 PROCEDURE — 90732 PPSV23 VACC 2 YRS+ SUBQ/IM: CPT | Performed by: INTERNAL MEDICINE

## 2019-11-27 PROCEDURE — G8419 CALC BMI OUT NRM PARAM NOF/U: HCPCS | Performed by: INTERNAL MEDICINE

## 2019-11-27 PROCEDURE — 1036F TOBACCO NON-USER: CPT | Performed by: INTERNAL MEDICINE

## 2019-11-27 PROCEDURE — 90471 IMMUNIZATION ADMIN: CPT | Performed by: INTERNAL MEDICINE

## 2019-11-27 PROCEDURE — 3017F COLORECTAL CA SCREEN DOC REV: CPT | Performed by: INTERNAL MEDICINE

## 2019-11-27 PROCEDURE — G8428 CUR MEDS NOT DOCUMENT: HCPCS | Performed by: INTERNAL MEDICINE

## 2020-02-21 ENCOUNTER — HOSPITAL ENCOUNTER (OUTPATIENT)
Dept: MRI IMAGING | Age: 55
Discharge: HOME | End: 2020-02-21
Payer: MEDICAID

## 2020-02-21 PROCEDURE — 72141 MRI NECK SPINE W/O DYE: CPT

## 2020-03-24 NOTE — PATIENT INSTRUCTIONS
Remember to bring all pulmonary medications to your next appointment with the office. Please keep all of your future appointments scheduled by [address] office. Out of respect for other patients and providers, you may be asked to reschedule your appointment if you arrive later than your scheduled appointment time. Appointments cancelled less than 24hrs in advance will be considered a no show. Patients with three missed appointments within 1 year or four missed appointments within 2 years can be dismissed from the practice. You may receive a survey regarding the care you received during your visit. Your input is valuable to us. We encourage you to complete and return your survey. We hope you will choose us in the future for your healthcare needs.

## 2020-04-09 RX ORDER — LEVALBUTEROL TARTRATE 45 UG/1
AEROSOL, METERED ORAL
Qty: 1 INHALER | Refills: 5 | Status: SHIPPED | OUTPATIENT
Start: 2020-04-09 | End: 2020-06-25 | Stop reason: ALTCHOICE

## 2020-04-13 ENCOUNTER — TELEPHONE (OUTPATIENT)
Dept: PULMONOLOGY | Age: 55
End: 2020-04-13

## 2020-04-13 NOTE — TELEPHONE ENCOUNTER
Caty Garsia approved Levalbuterol for one month 4/13/20-5/13/20. They would need chart notes showing that the pt. Has tried, or is unable to try Albuterol (generic proventil, ventolin) premixed nebulizers, Albuterol (generic of Accuneb). Their note is scanned to media. Please advise.

## 2020-04-14 NOTE — TELEPHONE ENCOUNTER
The patient tried and failed albuterol MDI due to adverse effects relating to tachycardia/palpitations. Use the above note and add albuterol to her stated allergies.

## 2020-05-29 ENCOUNTER — TELEPHONE (OUTPATIENT)
Dept: PULMONOLOGY | Age: 55
End: 2020-05-29

## 2020-05-29 NOTE — TELEPHONE ENCOUNTER
Pt. Is having to use her rescue inhaler all the time. Using three or four times before she gets relief. Going outside makes breathing worse. Feels like something is sitting on her chest denies wheezing, temp, cough this am productive but did not look at it.

## 2020-06-02 ENCOUNTER — TELEPHONE (OUTPATIENT)
Dept: PULMONOLOGY | Age: 55
End: 2020-06-02

## 2020-06-02 ENCOUNTER — VIRTUAL VISIT (OUTPATIENT)
Dept: PULMONOLOGY | Age: 55
End: 2020-06-02
Payer: MEDICAID

## 2020-06-02 ENCOUNTER — HOSPITAL ENCOUNTER (OUTPATIENT)
Age: 55
Discharge: HOME | End: 2020-06-02
Payer: MEDICAID

## 2020-06-02 LAB
BASOPHILS ABSOLUTE: 0 K/UL (ref 0–0.2)
BASOPHILS RELATIVE PERCENT: 1 %
EOSINOPHILS ABSOLUTE: 0.1 K/UL (ref 0–0.6)
EOSINOPHILS RELATIVE PERCENT: 2.5 %
HCT VFR BLD CALC: 41.2 % (ref 36–48)
HEMOGLOBIN: 13.4 G/DL (ref 12–16)
LYMPHOCYTES ABSOLUTE: 1.7 K/UL (ref 1–5.1)
LYMPHOCYTES RELATIVE PERCENT: 45.6 %
MCH RBC QN AUTO: 29.2 PG (ref 26–34)
MCHC RBC AUTO-ENTMCNC: 32.5 G/DL (ref 31–36)
MCV RBC AUTO: 90 FL (ref 80–100)
MONOCYTES ABSOLUTE: 0.3 K/UL (ref 0–1.3)
MONOCYTES RELATIVE PERCENT: 7.9 %
NEUTROPHILS ABSOLUTE: 1.6 K/UL (ref 1.7–7.7)
NEUTROPHILS RELATIVE PERCENT: 43 %
PDW BLD-RTO: 13.1 % (ref 12.4–15.4)
PLATELET # BLD: 192 K/UL (ref 135–450)
PMV BLD AUTO: 9.9 FL (ref 5–10.5)
RBC # BLD: 4.57 M/UL (ref 4–5.2)
WBC # BLD: 3.8 K/UL (ref 4–11)

## 2020-06-02 PROCEDURE — 85025 COMPLETE CBC W/AUTO DIFF WBC: CPT

## 2020-06-02 PROCEDURE — 36415 COLL VENOUS BLD VENIPUNCTURE: CPT

## 2020-06-02 PROCEDURE — 3017F COLORECTAL CA SCREEN DOC REV: CPT | Performed by: INTERNAL MEDICINE

## 2020-06-02 PROCEDURE — 82785 ASSAY OF IGE: CPT

## 2020-06-02 PROCEDURE — G8427 DOCREV CUR MEDS BY ELIG CLIN: HCPCS | Performed by: INTERNAL MEDICINE

## 2020-06-02 PROCEDURE — 86003 ALLG SPEC IGE CRUDE XTRC EA: CPT

## 2020-06-02 PROCEDURE — 99214 OFFICE O/P EST MOD 30 MIN: CPT | Performed by: INTERNAL MEDICINE

## 2020-06-02 RX ORDER — LEVALBUTEROL INHALATION SOLUTION 1.25 MG/3ML
1.25 SOLUTION RESPIRATORY (INHALATION) EVERY 8 HOURS PRN
Qty: 720 ML | Refills: 11 | Status: SHIPPED | OUTPATIENT
Start: 2020-06-02 | End: 2020-06-25 | Stop reason: ALTCHOICE

## 2020-06-02 RX ORDER — PREDNISONE 10 MG/1
TABLET ORAL
Qty: 35 TABLET | Refills: 0 | Status: SHIPPED | OUTPATIENT
Start: 2020-06-02 | End: 2020-06-17

## 2020-06-02 ASSESSMENT — ENCOUNTER SYMPTOMS
EYE DISCHARGE: 0
SORE THROAT: 0
CHOKING: 0
DIARRHEA: 0
SHORTNESS OF BREATH: 1
EYE ITCHING: 0
ABDOMINAL PAIN: 0
WHEEZING: 1
EYE PAIN: 0
CHEST TIGHTNESS: 0
VOICE CHANGE: 0
COUGH: 1
STRIDOR: 0
CONSTIPATION: 0

## 2020-06-02 NOTE — PROGRESS NOTES
  tiZANidine (ZANAFLEX) 4 MG tablet, , Disp: , Rfl:     omeprazole (PRILOSEC) 20 MG delayed release capsule, , Disp: , Rfl: 5    vitamin B-2 (RIBOFLAVIN) 100 MG TABS tablet, , Disp: , Rfl: 3    montelukast (SINGULAIR) 10 MG tablet, Take 10 mg by mouth nightly, Disp: , Rfl:     cetirizine (ZYRTEC) 10 MG tablet, Take 10 mg by mouth daily, Disp: , Rfl:     naloxegol (MOVANTIK) 25 MG TABS tablet, Take 25 mg by mouth every morning, Disp: , Rfl:     Multiple Vitamins-Minerals (THERAPEUTIC MULTIVITAMIN-MINERALS) tablet, Take 1 tablet by mouth daily, Disp: , Rfl:     Ascorbic Acid (VITAMIN C) 250 MG tablet, Take 1,000 mg by mouth daily, Disp: , Rfl:     estrogens, conjugated, (PREMARIN) 1.25 MG tablet, Take by mouth, Disp: , Rfl:     morphine (AVINZA) 60 MG extended release capsule, Take 60 mg by mouth ., Disp: , Rfl:     SUMAtriptan (IMITREX) 100 MG tablet, Take 200 mg by mouth once as needed. , Disp: , Rfl:     SUMAtriptan (IMITREX) 5 MG/ACT nasal spray, 1 spray by Nasal route daily as needed. , Disp: , Rfl:     quetiapine (SEROQUEL) 100 MG tablet, Take 50 mg by mouth nightly , Disp: , Rfl:     topiramate (TOPAMAX) 100 MG tablet, Take 200 mg by mouth daily. , Disp: , Rfl:     levalbuterol (XOPENEX HFA) 45 MCG/ACT inhaler, INHALE ONE TO TWO PUFFS BY MOUTH EVERY 4 HOURS AS NEEDED FOR WHEEZING (Patient not taking: Reported on 6/2/2020), Disp: 1 Inhaler, Rfl: 5    ibuprofen (IBU) 600 MG tablet, Take 1 tablet by mouth every 6 hours as needed for Pain. Take with food. (Patient not taking: Reported on 6/2/2020), Disp: 20 tablet, Rfl: 0    Latex; Acyclovir; Albuterol; Astemizole; Eggs [egg white]; Food color pink; Other; Penicillins; Sulfa antibiotics; Tobramycin-dexamethasone; and Codeine    There were no vitals filed for this visit. Review of Systems   Constitutional: Negative for chills, fever and unexpected weight change. HENT: Negative for mouth sores, sore throat and voice change.     Eyes: Negative for

## 2020-06-03 ENCOUNTER — TELEPHONE (OUTPATIENT)
Dept: PULMONOLOGY | Age: 55
End: 2020-06-03

## 2020-06-04 LAB
MISCELLANEOUS LAB TEST ORDER: NORMAL
MISCELLANEOUS LAB TEST RESULT: NORMAL

## 2020-06-05 LAB
2000687N OAK TREE IGE: <0.1 KU/L
ALLERGEN ASPERGILLUS ALTERNATA IGE: <0.1 KU/L
ALLERGEN ASPERGILLUS FUMIGATUS IGE: <0.1 KU/L
ALLERGEN BIRCH IGE: <0.1 KU/L
ALLERGEN CAT DANDER IGE: <0.1 KU/L
ALLERGEN COMMON SHORT RAGWEED IGE: <0.1 KU/L
ALLERGEN COTTONWOOD: <0.1 KU/L
ALLERGEN DOG DANDER IGE: <0.1 KU/L
ALLERGEN ELM IGE: <0.1 KU/L
ALLERGEN ENGLISH PLANTAIN: <0.1 KU/L
ALLERGEN HORMODENDRUM HORDEI IGE: <0.1 KU/L
ALLERGEN MAPLE/BOX ELDER IGE: <0.1 KU/L
ALLERGEN MITE DUST FARINAE IGE: <0.1 KU/L
ALLERGEN SEE NOTE: NORMAL
ALLERGEN TREE SYCAMORE: <0.1 KU/L
ALLERGEN WALNUT TREE IGE: <0.1 KU/L
ALLERGEN, TREE, WHITE ASH IGE: <0.1 KU/L
IGE: 10 KU/L

## 2020-06-10 RX ORDER — LEVALBUTEROL INHALATION SOLUTION 1.25 MG/3ML
1.25 SOLUTION RESPIRATORY (INHALATION) EVERY 8 HOURS PRN
Qty: 720 ML | Refills: 11 | Status: SHIPPED | OUTPATIENT
Start: 2020-06-10 | End: 2022-01-17 | Stop reason: SDUPTHER

## 2020-06-10 NOTE — TELEPHONE ENCOUNTER
I reviewed denial letter, looks like she has to fail albuterol nebs to qualify under [facility]. She has previously failed and is also allergic to albuterol nebs.  I resubmitted script for xopenex

## 2020-06-15 ENCOUNTER — TELEPHONE (OUTPATIENT)
Dept: PULMONOLOGY | Age: 55
End: 2020-06-15

## 2020-06-22 ENCOUNTER — HOSPITAL ENCOUNTER (OUTPATIENT)
Dept: MAMMOGRAPHY | Age: 55
Discharge: HOME | End: 2020-06-22
Payer: MEDICAID

## 2020-06-22 PROBLEM — J45.50 SEVERE PERSISTENT ASTHMA WITHOUT COMPLICATION: Status: ACTIVE | Noted: 2020-06-22

## 2020-06-22 PROCEDURE — 77063 BREAST TOMOSYNTHESIS BI: CPT

## 2020-06-25 ENCOUNTER — VIRTUAL VISIT (OUTPATIENT)
Dept: PULMONOLOGY | Age: 55
End: 2020-06-25
Payer: MEDICAID

## 2020-06-25 PROCEDURE — 3017F COLORECTAL CA SCREEN DOC REV: CPT | Performed by: INTERNAL MEDICINE

## 2020-06-25 PROCEDURE — G8419 CALC BMI OUT NRM PARAM NOF/U: HCPCS | Performed by: INTERNAL MEDICINE

## 2020-06-25 PROCEDURE — G8427 DOCREV CUR MEDS BY ELIG CLIN: HCPCS | Performed by: INTERNAL MEDICINE

## 2020-06-25 PROCEDURE — 99213 OFFICE O/P EST LOW 20 MIN: CPT | Performed by: INTERNAL MEDICINE

## 2020-06-25 PROCEDURE — 1036F TOBACCO NON-USER: CPT | Performed by: INTERNAL MEDICINE

## 2020-06-25 PROCEDURE — 3023F SPIROM DOC REV: CPT | Performed by: INTERNAL MEDICINE

## 2020-06-25 PROCEDURE — G8926 SPIRO NO PERF OR DOC: HCPCS | Performed by: INTERNAL MEDICINE

## 2020-06-25 RX ORDER — AZITHROMYCIN 250 MG/1
250 TABLET, FILM COATED ORAL SEE ADMIN INSTRUCTIONS
Qty: 6 TABLET | Refills: 0 | Status: SHIPPED | OUTPATIENT
Start: 2020-06-25 | End: 2020-06-30

## 2020-06-25 ASSESSMENT — ENCOUNTER SYMPTOMS
RHINORRHEA: 1
SHORTNESS OF BREATH: 1
SINUS PRESSURE: 1
VOICE CHANGE: 1
WHEEZING: 1

## 2020-06-25 NOTE — PATIENT INSTRUCTIONS
Remember to bring all pulmonary medications to your next appointment with the office. Please keep all of your future appointments scheduled by [address], [facility]. Out of respect for other patients and providers, you may be asked to reschedule your appointment if you arrive later than your scheduled appointment time. Appointments cancelled less than 24hrs in advance will be considered a no show. Patients with three missed appointments within 1 year or four missed appointments within 2 years can be dismissed from the practice. You may receive a survey regarding the care you received during your visit. Your input is valuable to us. We encourage you to complete and return your survey. We hope you will choose us in the future for your healthcare needs.

## 2020-06-26 ENCOUNTER — HOSPITAL ENCOUNTER (OUTPATIENT)
Dept: CT IMAGING | Age: 55
Discharge: HOME | End: 2020-06-26
Payer: MEDICAID

## 2020-06-26 PROCEDURE — 71250 CT THORAX DX C-: CPT

## 2020-06-26 ASSESSMENT — ENCOUNTER SYMPTOMS
BACK PAIN: 1
COUGH: 1
CHEST TIGHTNESS: 1

## 2020-09-09 ENCOUNTER — HOSPITAL ENCOUNTER (OUTPATIENT)
Age: 55
Discharge: HOME | End: 2020-09-09
Payer: MEDICAID

## 2020-09-09 ENCOUNTER — HOSPITAL ENCOUNTER (OUTPATIENT)
Dept: GENERAL RADIOLOGY | Age: 55
Discharge: HOME | End: 2020-09-09
Payer: MEDICAID

## 2020-09-09 PROCEDURE — 73030 X-RAY EXAM OF SHOULDER: CPT

## 2020-11-13 ENCOUNTER — TELEPHONE (OUTPATIENT)
Dept: PULMONOLOGY | Age: 55
End: 2020-11-13

## 2020-11-15 RX ORDER — MONTELUKAST SODIUM 10 MG/1
10 TABLET ORAL NIGHTLY
Qty: 30 TABLET | Refills: 15 | Status: SHIPPED | OUTPATIENT
Start: 2020-11-15 | End: 2021-12-03

## 2020-12-02 ENCOUNTER — VIRTUAL VISIT (OUTPATIENT)
Dept: PULMONOLOGY | Age: 55
End: 2020-12-02
Payer: MEDICAID

## 2020-12-02 PROCEDURE — G8926 SPIRO NO PERF OR DOC: HCPCS | Performed by: INTERNAL MEDICINE

## 2020-12-02 PROCEDURE — 1036F TOBACCO NON-USER: CPT | Performed by: INTERNAL MEDICINE

## 2020-12-02 PROCEDURE — 3017F COLORECTAL CA SCREEN DOC REV: CPT | Performed by: INTERNAL MEDICINE

## 2020-12-02 PROCEDURE — G8421 BMI NOT CALCULATED: HCPCS | Performed by: INTERNAL MEDICINE

## 2020-12-02 PROCEDURE — G8427 DOCREV CUR MEDS BY ELIG CLIN: HCPCS | Performed by: INTERNAL MEDICINE

## 2020-12-02 PROCEDURE — 3023F SPIROM DOC REV: CPT | Performed by: INTERNAL MEDICINE

## 2020-12-02 PROCEDURE — G8482 FLU IMMUNIZE ORDER/ADMIN: HCPCS | Performed by: INTERNAL MEDICINE

## 2020-12-02 PROCEDURE — 99214 OFFICE O/P EST MOD 30 MIN: CPT | Performed by: INTERNAL MEDICINE

## 2020-12-02 RX ORDER — BIOTIN 1 MG
1000 TABLET ORAL NIGHTLY
COMMUNITY

## 2020-12-02 RX ORDER — MORPHINE SULFATE 30 MG/1
30 TABLET, FILM COATED, EXTENDED RELEASE ORAL NIGHTLY
COMMUNITY
Start: 2020-11-30

## 2020-12-02 RX ORDER — NALOXEGOL OXALATE 25 MG/1
25 TABLET, FILM COATED ORAL DAILY
COMMUNITY
Start: 2020-10-10 | End: 2020-12-02 | Stop reason: SDUPTHER

## 2020-12-02 RX ORDER — KETOROLAC TROMETHAMINE 10 MG/1
TABLET, FILM COATED ORAL PRN
COMMUNITY
Start: 2020-11-10

## 2020-12-02 RX ORDER — VITAMIN B COMPLEX
1000 TABLET ORAL DAILY
COMMUNITY

## 2020-12-02 ASSESSMENT — ENCOUNTER SYMPTOMS
SHORTNESS OF BREATH: 1
RHINORRHEA: 1
WHEEZING: 1
VOICE CHANGE: 1
COUGH: 1
EYE ITCHING: 1
BACK PAIN: 1
CHEST TIGHTNESS: 1
SINUS PRESSURE: 1

## 2020-12-02 NOTE — LETTER
[facility] Pulmonary Critical Care and Sleep  [address] [facility] [address]  Phone: [phone]  Fax: [phone]        December 2, 2020    [address] [place] [address] [address] [address]      Dear Stephy Nunes:    Burak Gonzalez are scheduled for the following tests:    COVID screening at [facility] on Tuesday 12/15/20 @ 11:50 AM.  Burak Gonzalez pull in to the main entrance of the hospital and curve around to the right. You'll see signs for COVID testing. Stay in your car and they'll direct you through. This test has to be completed within 7 days of your pulmonary function test.    Your pulmonary function test is scheduled on Monday 12/21/20 @ [facility].  Please arrive at 10:00 AM for your test which is scheduled at 10:30. Do not eat or drink anything 1 hour prior and no inhalers 4 hours prior to the test.    And finally, your follow up virtual visit with Dr. Koenig Morning is scheduled on Wednesday 1/13/21 @ 9:15 AM.      If you have any questions or concerns, please don't hesitate to call.     Sincerely,    [facility]

## 2020-12-02 NOTE — LETTER
[facility] Pulmonary Critical Care and Sleep  [address] [facility] [address] [facility] [address]  Phone: [phone]  Fax: [phone]    Jolene Molina MD        December 2, 2020     Patient: Junior Brewer   YOB: 1965   Date of Visit: 12/2/2020       To Whom It May Concern: It is my medical opinion that L[place]  cannot work around cleaning chemicals due to asthma and COPD. If you have any questions or concerns, please don't hesitate to call.     Sincerely,        Jolene Molina MD

## 2020-12-02 NOTE — PROGRESS NOTES
summer and spring. She cannot do outdoor when the pollen count is high. She notes constriction in her chest, shortness of breath, eye symptoms and nasal congestion. She also notes chest pain. She feels better when she wears a mask. She uses levalbuterol MDI, more in the spring and summer hours. When symptoms are worse, she needs a nebulizer. 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 She has increased symptoms when she is exposed to cleaning solutions and sprays. She is mildly short of breath climbing up stairs, but not the fewer stairs that she has in her home. He has gained about 15 pounds since the COVID-19 situation. She does snore at night. He also has issues with insomnia. She has a good appetite, no GI or  complaints. The rest of her ROS was negative. Her medications and allergies were reviewed. Previous notes reviewed and edited as necessary. Ann Marie Saavedra was recently seen by Dr. Patria Parekh on 6/2, since she had failed usual treatment for asthma, she had been referred to an allergist and allergen profile was ordered. Prednisone taper was given to be started after lab work was completed. Xopenex was refilled and she was asked to come back if she did not improve. CXR and possible bronchoscopy was to be considered if she failed to improve. She says her asthma did improve, but she has had increased symptoms again along with pleuritic chest pain on the right. She denies any fever or chills. She has minimal cough at night with yellowish-green phlegm, denies hemoptysis. She also mentions that she cannot raise her right arm as there is pain under the ribs when she moves her arm. The patient describes multiple episodes of \"pleurisy\". Her PFT 6/27/2017 has shown FEV1 1.34 L, 44% predicted. There was a large bronchodilator response. Last CXR was on 4/2/2019. She has not smoked for the last 12 years.   She smoked on and off, having quit for 15 years, 2 years intermittently. She smoked about 1.5 PPD for 10 years. On 6/2/2020, CBC was normal, IgE and allergen profile were normal.  Last CT on 7/20/2017 showed possible left lower lobe bulla. The patient gets her pain medications from Dr. Tamar Kessler in [place], she saw him when she was living in [place]. Migraine, neck and back pain with narcotic use, insomnia for which she is on Seroquel. Previous notes reviewed and edited as necessary. Shahnaz Coello is a 53-year-old female being seen for my partner Dr. Sarika Huerta. She was last seen by him on 9/27/17. She is being followed for asthma, was well controlled on Dulera and when necessary Xopenex. At that visit she had described snoring, strong family history and had daytime somnolence. She had been asked to continue her bronchodilator regimen, and was asked to get evaluated for VADIM. About 1-1.5 weeks ago, she developed an upper respiratory tract infection involving her upper airway, and simultaneously had chest infection. She had cough with phlegm that is dark yellowish. She denied hemoptysis. She was feverish, but did not have a thermometer, when she did buy a thermometer, her fever was gone. She had lost her voice, and this is slightly improved, she missed one day of work last week. Her boyfriend got sick after her, there was no recent travel or other sick contacts. Her boyfriend does not smoke. She also had increased dyspnea, and was using her rescue inhaler very frequently, up to 3 times a day. She has a spacer that she uses on her albuterol, but not on Dulera. She was compliant with the [facility]. She denied GI or  complaints. The patient gives a history of smoking up to 1.5 pack per day, but for less than 10 years intermittently. She has not smoked for more than 15 years. I reviewed her CT chest and her PFT. Her last PFT on 6/27/17 showed FEV1 of 1.34 L, 44% predicted, with a good bronchodilator response.   TLC was reduced at 77% predicted, there was evidence of air trapping. There was a significant decline in her TLC from 6/29/10.     Past Medical History:   Diagnosis Date    Asthma     Back pain     Headache     Neck pain        Past Surgical History:   Procedure Laterality Date    FOOT SURGERY      right    HIP SURGERY      HYSTERECTOMY      NECK SURGERY      NECK SURGERY         Social History     Tobacco Use    Smoking status: Former Smoker     Packs/day: 0.50     Years: 10.00     Pack years: 5.00     Types: Cigarettes     Start date: 1977     Last attempt to quit: 2007     Years since quittin.9    Smokeless tobacco: Never Used   Substance Use Topics    Alcohol use: Not Currently       Family History   Problem Relation Age of Onset    High Blood Pressure Father     Heart Disease Father         aorta ruptured    Heart Disease Maternal Grandmother         pacemaker         Current Outpatient Medications:     morphine (MS CONTIN) 30 MG extended release tablet, Take 30 mg by mouth nightly., Disp: , Rfl:     Biotin 1000 MCG TABS, Take 1,000 mcg by mouth nightly, Disp: , Rfl:     Vitamin D (CHOLECALCIFEROL) 25 MCG (1000 UT) TABS tablet, Take 1,000 Units by mouth daily, Disp: , Rfl:     ketorolac (TORADOL) 10 MG tablet, as needed, Disp: , Rfl:     montelukast (SINGULAIR) 10 MG tablet, Take 1 tablet by mouth nightly, Disp: 30 tablet, Rfl: 15    levalbuterol (XOPENEX) 1.25 MG/3ML nebulizer solution, Take 3 mLs by nebulization every 8 hours as needed for Wheezing or Shortness of Breath, Disp: 720 mL, Rfl: 11    tiotropium (SPIRIVA) 18 MCG inhalation capsule, Inhale 18 mcg into the lungs daily , Disp: , Rfl:     tiZANidine (ZANAFLEX) 4 MG tablet, Take 4 mg by mouth nightly , Disp: , Rfl:     omeprazole (PRILOSEC) 20 MG delayed release capsule, Take 20 mg by mouth 2 times daily , Disp: , Rfl: 5    vitamin B-2 (RIBOFLAVIN) 100 MG TABS tablet, , Disp: , Rfl: 3    cetirizine (ZYRTEC) 10 MG tablet, Take 10 mg by mouth daily, Disp: , Rfl:     naloxegol (MOVANTIK) 25 MG TABS tablet, Take 25 mg by mouth every morning, Disp: , Rfl:     Multiple Vitamins-Minerals (THERAPEUTIC MULTIVITAMIN-MINERALS) tablet, Take 1 tablet by mouth daily, Disp: , Rfl:     Ascorbic Acid (VITAMIN C) 250 MG tablet, Take 1,000 mg by mouth daily, Disp: , Rfl:     estrogens, conjugated, (PREMARIN) 1.25 MG tablet, Take by mouth, Disp: , Rfl:     morphine (AVINZA) 60 MG extended release capsule, Take 60 mg by mouth daily. , Disp: , Rfl:     QUEtiapine (SEROQUEL) 50 MG tablet, Take 50 mg by mouth nightly , Disp: , Rfl:     topiramate (TOPAMAX) 200 MG tablet, Take 200 mg by mouth daily , Disp: , Rfl:     Erenumab-aooe (AIMOVIG SC), Inject into the skin every 30 days, Disp: , Rfl:     ipratropium (ATROVENT) 0.02 % nebulizer solution, Take 2.5 mLs by nebulization 4 times daily (Patient not taking: Reported on 12/2/2020), Disp: 2.5 mL, Rfl: 3    SUMAtriptan (IMITREX) 100 MG tablet, Take 200 mg by mouth once as needed. , Disp: , Rfl:     SUMAtriptan (IMITREX) 5 MG/ACT nasal spray, 1 spray by Nasal route daily as needed. , Disp: , Rfl:     Latex; Acyclovir; Albuterol; Astemizole; Eggs [egg white]; Food color pink; Other; Penicillins; Sulfa antibiotics; Tobramycin-dexamethasone; and Codeine    There were no vitals filed for this visit. Review of Systems   HENT: Positive for congestion, postnasal drip, rhinorrhea, sinus pressure and voice change. Eyes: Positive for itching. Respiratory: Positive for cough, chest tightness, shortness of breath and wheezing. Chest wall pain   Musculoskeletal: Positive for back pain (Including neck pain). Psychiatric/Behavioral: Positive for sleep disturbance. All other systems reviewed and are negative. Physical exam:  Physical exam was not performed as this was a virtual video visit. This was a virtual visit, the patient was awake, alert, oriented.   She was able to speak in full sentences and had no cough during the visit. She was wearing glasses, external eye exam was normal.  Oral cavity revealed no lesions, she had a relatively crowded oropharynx. Neck showed no adenopathy or JVD. She denied leg edema.

## 2020-12-03 ENCOUNTER — HOSPITAL ENCOUNTER (OUTPATIENT)
Age: 55
Discharge: HOME | End: 2020-12-03
Payer: MEDICAID

## 2020-12-03 PROCEDURE — 82104 ALPHA-1-ANTITRYPSIN PHENO: CPT

## 2020-12-03 PROCEDURE — 82103 ALPHA-1-ANTITRYPSIN TOTAL: CPT

## 2020-12-07 ENCOUNTER — TELEPHONE (OUTPATIENT)
Dept: PULMONOLOGY | Age: 55
End: 2020-12-07

## 2020-12-07 LAB
ALPHA-1 ANTITRYPSIN PHENOTYPE: NORMAL
ALPHA-1 ANTITRYPSIN: 160 MG/DL (ref 90–200)

## 2020-12-07 NOTE — TELEPHONE ENCOUNTER
Pt. 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

## 2020-12-15 ENCOUNTER — OFFICE VISIT (OUTPATIENT)
Dept: PRIMARY CARE CLINIC | Age: 55
End: 2020-12-15
Payer: MEDICAID

## 2020-12-15 PROCEDURE — 99211 OFF/OP EST MAY X REQ PHY/QHP: CPT | Performed by: NURSE PRACTITIONER

## 2020-12-15 PROCEDURE — G8428 CUR MEDS NOT DOCUMENT: HCPCS | Performed by: NURSE PRACTITIONER

## 2020-12-15 PROCEDURE — G8421 BMI NOT CALCULATED: HCPCS | Performed by: NURSE PRACTITIONER

## 2020-12-15 NOTE — PROGRESS NOTES
Kayleen Cuellar received a viral test for COVID-19. They were educated on isolation and quarantine as appropriate. For any symptoms, they were directed to seek care from their PCP, given contact information to establish with a doctor, directed to an urgent care or the emergency room.

## 2020-12-15 NOTE — PATIENT INSTRUCTIONS
Advance Care Planning  People with COVID-19 may have no symptoms, mild symptoms, such as fever, cough, and shortness of breath or they may have more severe illness, developing severe and fatal pneumonia. As a result, Advance Care Planning with attention to naming a health care decision maker (someone you trust to make healthcare decisions for you if you could not speak for yourself) and sharing other health care preferences is important BEFORE a possible health crisis. Please contact your Primary Care Provider to discuss Advance Care Planning. Preventing the Spread of Coronavirus Disease 2019 in Homes and Residential Communities  For the most recent information go to [email or web address]    Prevention steps for People with confirmed or suspected COVID-19 (including persons under investigation) who do not need to be hospitalized  and   People with confirmed COVID-19 who were hospitalized and determined to be medically stable to go home    Your healthcare provider and public health staff will evaluate whether you can be cared for at home. If it is determined that you do not need to be hospitalized and can be isolated at home, you will be monitored by staff from your local or state health department. You should follow the prevention steps below until a healthcare provider or local or state health department says you can return to your normal activities. Stay home except to get medical care  People who are mildly ill with COVID-19 are able to isolate at home during their illness. You should restrict activities outside your home, except for getting medical care. Do not go to work, school, or public areas. Avoid using public transportation, ride-sharing, or taxis.   Separate yourself from other people and animals in your home Wash your hands often with soap and water for at least 20 seconds, especially after blowing your nose, coughing, or sneezing; going to the bathroom; and before eating or preparing food. If soap and water are not readily available, use an alcohol-based hand  with at least 60% alcohol, covering all surfaces of your hands and rubbing them together until they feel dry. Soap and water are the best option if hands are visibly dirty. Avoid touching your eyes, nose, and mouth with unwashed hands. Avoid sharing personal household items  You should not share dishes, drinking glasses, cups, eating utensils, towels, or bedding with other people or pets in your home. After using these items, they should be washed thoroughly with soap and water. Clean all high-touch surfaces everyday  High touch surfaces include counters, tabletops, doorknobs, bathroom fixtures, toilets, phones, keyboards, tablets, and bedside tables. Also, clean any surfaces that may have blood, stool, or body fluids on them. Use a household cleaning spray or wipe, according to the label instructions. Labels contain instructions for safe and effective use of the cleaning product including precautions you should take when applying the product, such as wearing gloves and making sure you have good ventilation during use of the product.   Monitor your symptoms

## 2020-12-17 LAB — SARS-COV-2, NAA: NOT DETECTED

## 2020-12-21 ENCOUNTER — HOSPITAL ENCOUNTER (OUTPATIENT)
Dept: PULMONOLOGY | Age: 55
Discharge: HOME | End: 2020-12-21
Payer: MEDICAID

## 2020-12-21 LAB
DLCO %PRED: 97 %
DLCO PRED: NORMAL
DLCO/VA %PRED: NORMAL
DLCO/VA PRED: NORMAL
DLCO/VA: NORMAL
DLCO: NORMAL
EXPIRATORY TIME-POST: NORMAL
EXPIRATORY TIME: NORMAL
FEF 25-75% %CHNG: NORMAL
FEF 25-75% %PRED-POST: NORMAL
FEF 25-75% %PRED-PRE: NORMAL
FEF 25-75% PRED: NORMAL
FEF 25-75%-POST: NORMAL
FEF 25-75%-PRE: NORMAL
FEV1 %PRED-POST: 100 %
FEV1 %PRED-PRE: 97 %
FEV1 PRED: NORMAL
FEV1-POST: NORMAL
FEV1-PRE: NORMAL
FEV1/FVC %PRED-POST: NORMAL
FEV1/FVC %PRED-PRE: NORMAL
FEV1/FVC PRED: NORMAL
FEV1/FVC-POST: 79 %
FEV1/FVC-PRE: 78 %
FVC %PRED-POST: NORMAL
FVC %PRED-PRE: NORMAL
FVC PRED: NORMAL
FVC-POST: NORMAL
FVC-PRE: NORMAL
GAW %PRED: NORMAL
GAW PRED: NORMAL
GAW: NORMAL
IC %PRED: NORMAL
IC PRED: NORMAL
IC: NORMAL
MEP: NORMAL
MIP: NORMAL
MVV %PRED-PRE: NORMAL
MVV PRED: NORMAL
MVV-PRE: NORMAL
PEF %PRED-POST: NORMAL
PEF %PRED-PRE: NORMAL
PEF PRED: NORMAL
PEF%CHNG: NORMAL
PEF-POST: NORMAL
PEF-PRE: NORMAL
RAW %PRED: NORMAL
RAW PRED: NORMAL
RAW: NORMAL
RV %PRED: NORMAL
RV PRED: NORMAL
RV: NORMAL
SVC %PRED: NORMAL
SVC PRED: NORMAL
SVC: NORMAL
TLC %PRED: 94 %
TLC PRED: NORMAL
TLC: NORMAL
VA %PRED: NORMAL
VA PRED: NORMAL
VA: NORMAL
VTG %PRED: NORMAL
VTG PRED: NORMAL
VTG: NORMAL

## 2020-12-21 PROCEDURE — 2500000003 HC RX 250 WO HCPCS: Performed by: INTERNAL MEDICINE

## 2020-12-21 PROCEDURE — 94060 EVALUATION OF WHEEZING: CPT

## 2020-12-21 PROCEDURE — 94729 DIFFUSING CAPACITY: CPT

## 2020-12-21 PROCEDURE — 94726 PLETHYSMOGRAPHY LUNG VOLUMES: CPT

## 2020-12-21 PROCEDURE — 94760 N-INVAS EAR/PLS OXIMETRY 1: CPT

## 2020-12-21 RX ORDER — LEVALBUTEROL TARTRATE 45 UG/1
4 AEROSOL, METERED ORAL ONCE
Status: COMPLETED | OUTPATIENT
Start: 2020-12-21 | End: 2020-12-21

## 2020-12-21 RX ADMIN — Medication 4 PUFF: at 10:57

## 2020-12-21 ASSESSMENT — PULMONARY FUNCTION TESTS
FEV1/FVC_POST: 79
FEV1/FVC_PRE: 78
FEV1_PERCENT_PREDICTED_POST: 100
FEV1_PERCENT_PREDICTED_PRE: 97

## 2020-12-22 NOTE — PROCEDURES
[address] [address]                               PULMONARY FUNCTION    PATIENT NAME: Caitie Rucker                  :        1965  MED REC NO:   [id number]                          ROOM:  ACCOUNT NO:   [de-identified]                           ADMIT DATE: 2020  PROVIDER:     Estefani Lutz MD    DATE OF PROCEDURE:  2020    PFT INTERPRETATION    The patient is a 21-year-old female who underwent a PFT for a severe  persistent asthma. Spirometry shows FVC to be 96%, FEV1 to be 97%, FEV1  to FVC ratio was 99%, TBQ42-51% was 99%. The patient did not have any  significant postbronchodilator improvement on the study. Lung volume  shows the total lung capacity was normal.  The patient does not have any  air trapping or hyperinflation. The patient's diffusion capacity was  normal.  The patient's flow-volume loop was normal.  The patient also  had a PFT done in 2017 at that time, the patient's FVC was 53%, FEV1  was 44%, FEV1 to FVC ratio was 82%, QPX64-52% was 38%. The patient also  had total lung capacity of 77% at that time and the patient's ERV was  49%. On the basis of this PFT, the patient has essentially a normal PFT  and as compared to 2017 all the respiratory parameters in the form of  FVC, FEV1, WXN21-54% total lung capacity as well as ERV has improved. Please correlate clinically.         Nae Lovett MD    D: 2020 11:18:45       T: 2020 11:23:47     [email or web address]  Job#: [id number]     Doc#: [id number]    CC:

## 2021-01-04 ENCOUNTER — TELEPHONE (OUTPATIENT)
Dept: PULMONOLOGY | Age: 56
End: 2021-01-04

## 2021-01-04 DIAGNOSIS — J45.50 SEVERE PERSISTENT ASTHMA WITHOUT COMPLICATION: Primary | ICD-10-CM

## 2021-01-04 RX ORDER — PREDNISONE 20 MG/1
20 TABLET ORAL 2 TIMES DAILY
Qty: 10 TABLET | Refills: 0 | Status: SHIPPED | OUTPATIENT
Start: 2021-01-04 | End: 2021-01-09

## 2021-01-04 NOTE — TELEPHONE ENCOUNTER
C/O lungs hurt keeps getting HA's no fevers but she hasn't taken it. Slight cough no congestion, no mucous. Can't sleep up and down all night chest hurst's so bad. Feels like something sitting on her chest when she lays down. Please advise.

## 2021-01-04 NOTE — TELEPHONE ENCOUNTER
Sent a course of steroids  Can she be set up to see us in the office, any provider  Recommend she either go for covid testing or to ED if worsening despite above

## 2021-01-05 NOTE — TELEPHONE ENCOUNTER
Pt informed of Dr. Reza Reasons response below and verbalized understanding. Pt states she has crackling in her lungs and would like for Dr. Jass Dunham to order an xray. Please place order if appropriate.

## 2021-01-06 ENCOUNTER — HOSPITAL ENCOUNTER (OUTPATIENT)
Dept: GENERAL RADIOLOGY | Age: 56
Discharge: HOME | End: 2021-01-06
Payer: MEDICAID

## 2021-01-06 ENCOUNTER — HOSPITAL ENCOUNTER (OUTPATIENT)
Age: 56
Discharge: HOME | End: 2021-01-06
Payer: MEDICAID

## 2021-01-06 ENCOUNTER — TELEPHONE (OUTPATIENT)
Dept: PULMONOLOGY | Age: 56
End: 2021-01-06

## 2021-01-06 DIAGNOSIS — J45.50 SEVERE PERSISTENT ASTHMA WITHOUT COMPLICATION: ICD-10-CM

## 2021-01-06 PROCEDURE — 71046 X-RAY EXAM CHEST 2 VIEWS: CPT

## 2021-01-06 NOTE — TELEPHONE ENCOUNTER
CXR was negative though, if she is not improving with steroids please see if she can be seen by one of us as an acute visit asap.  Can also go to ED

## 2021-01-06 NOTE — TELEPHONE ENCOUNTER
Pt stopped in office today requesting a letter to keep her off of work. Pt got CXR done today as well.

## 2021-01-13 ENCOUNTER — VIRTUAL VISIT (OUTPATIENT)
Dept: PULMONOLOGY | Age: 56
End: 2021-01-13
Payer: MEDICAID

## 2021-01-13 DIAGNOSIS — R06.02 SHORTNESS OF BREATH: Primary | ICD-10-CM

## 2021-01-13 DIAGNOSIS — J45.50 SEVERE PERSISTENT ASTHMA WITHOUT COMPLICATION: ICD-10-CM

## 2021-01-13 DIAGNOSIS — R07.89 OTHER CHEST PAIN: ICD-10-CM

## 2021-01-13 PROCEDURE — G8427 DOCREV CUR MEDS BY ELIG CLIN: HCPCS | Performed by: INTERNAL MEDICINE

## 2021-01-13 PROCEDURE — 3017F COLORECTAL CA SCREEN DOC REV: CPT | Performed by: INTERNAL MEDICINE

## 2021-01-13 PROCEDURE — 99214 OFFICE O/P EST MOD 30 MIN: CPT | Performed by: INTERNAL MEDICINE

## 2021-01-13 ASSESSMENT — ENCOUNTER SYMPTOMS
SHORTNESS OF BREATH: 1
CHOKING: 0
CHEST TIGHTNESS: 1
DIARRHEA: 0
ABDOMINAL PAIN: 0
SORE THROAT: 0
EYE DISCHARGE: 0
COUGH: 0
EYE PAIN: 0
STRIDOR: 0
EYE ITCHING: 0
VOICE CHANGE: 0
CONSTIPATION: 0

## 2021-01-13 NOTE — PROGRESS NOTES
Dr Genny Wilson pulmonologist is referring patient to me for evaulation of chest pain and shortness of breath  Please call and schedule office visit 15 minutes should be ok.  I saw her in 2017

## 2021-01-13 NOTE — PATIENT INSTRUCTIONS
Remember to bring a list of pulmonary medications and any CPAP or BiPAP machines to your next appointment with the office. Please keep all of your future appointments scheduled by [facility] Pulmonary office. Out of respect for other patients and providers, you may be asked to reschedule your appointment if you arrive later than your scheduled appointment time. Appointments cancelled less than 24hrs in advance will be considered a no show. Patients with three missed appointments within 1 year or four missed appointments within 2 years can be dismissed from the practice. You may receive a survey regarding the care you received during your visit. Your input is valuable to us. We encourage you to complete and return your survey. We hope you will choose us in the future for your healthcare needs. Pt instructed of all future appointment dates & times, including radiology, labs, procedures & referrals. If procedures were scheduled preparation instructions provided. Instructions on future appointments with [facility] were given.

## 2021-01-13 NOTE — PROGRESS NOTES
MA Communication: The following orders are received by verbal communication from Dr. Gerardo Lord.     Orders include:  ECHO       CXR       Stress Test       Referral to Cardiology       FU Past

## 2021-01-13 NOTE — PROGRESS NOTES
Pulmonary Outpatient Note   Kaitlin Lucio MD       2021    Chief Complaint:  Follow-up (4 wk fu) and Asthma     HPI:   63y.o. year old female here for asthma. Virtual visit through [facility]. me    PFTs reviewed, normal with FEV1 of 100%  States she still is experiencing exertional shortness of breath and chest pain that is pleuritic (has had this for many years)  Adherent to her inhalers       Past Medical History:   Diagnosis Date    Asthma     Back pain     Headache     Neck pain        Past Surgical History:   Procedure Laterality Date    FOOT SURGERY      right    HIP SURGERY      HYSTERECTOMY      NECK SURGERY      NECK SURGERY         Social History     Tobacco Use    Smoking status: Former Smoker     Packs/day: 0.50     Years: 10.00     Pack years: 5.00     Types: Cigarettes     Start date: 1977     Quit date: 2007     Years since quittin.0    Smokeless tobacco: Never Used   Substance Use Topics    Alcohol use: Not Currently       Family History   Problem Relation Age of Onset    High Blood Pressure Father     Heart Disease Father         aorta ruptured    Heart Disease Maternal Grandmother         pacemaker         Current Outpatient Medications:     morphine (MS CONTIN) 30 MG extended release tablet, Take 30 mg by mouth nightly., Disp: , Rfl:     Biotin 1000 MCG TABS, Take 1,000 mcg by mouth nightly, Disp: , Rfl:     Vitamin D (CHOLECALCIFEROL) 25 MCG (1000 UT) TABS tablet, Take 1,000 Units by mouth daily, Disp: , Rfl:     ketorolac (TORADOL) 10 MG tablet, as needed, Disp: , Rfl:     montelukast (SINGULAIR) 10 MG tablet, Take 1 tablet by mouth nightly, Disp: 30 tablet, Rfl: 15    levalbuterol (XOPENEX) 1.25 MG/3ML nebulizer solution, Take 3 mLs by nebulization every 8 hours as needed for Wheezing or Shortness of Breath, Disp: 720 mL, Rfl: 11    Erenumab-aooe (AIMOVIG SC), Inject into the skin every 30 days, Disp: , Rfl:   ipratropium (ATROVENT) 0.02 % nebulizer solution, Take 2.5 mLs by nebulization 4 times daily, Disp: 2.5 mL, Rfl: 3    tiotropium (SPIRIVA) 18 MCG inhalation capsule, Inhale 18 mcg into the lungs daily , Disp: , Rfl:     tiZANidine (ZANAFLEX) 4 MG tablet, Take 4 mg by mouth nightly , Disp: , Rfl:     omeprazole (PRILOSEC) 20 MG delayed release capsule, Take 20 mg by mouth 2 times daily , Disp: , Rfl: 5    vitamin B-2 (RIBOFLAVIN) 100 MG TABS tablet, , Disp: , Rfl: 3    cetirizine (ZYRTEC) 10 MG tablet, Take 10 mg by mouth daily, Disp: , Rfl:     naloxegol (MOVANTIK) 25 MG TABS tablet, Take 25 mg by mouth every morning, Disp: , Rfl:     Multiple Vitamins-Minerals (THERAPEUTIC MULTIVITAMIN-MINERALS) tablet, Take 1 tablet by mouth daily, Disp: , Rfl:     Ascorbic Acid (VITAMIN C) 250 MG tablet, Take 1,000 mg by mouth daily, Disp: , Rfl:     estrogens, conjugated, (PREMARIN) 1.25 MG tablet, Take by mouth, Disp: , Rfl:     morphine (AVINZA) 60 MG extended release capsule, Take 60 mg by mouth daily. , Disp: , Rfl:     SUMAtriptan (IMITREX) 100 MG tablet, Take 200 mg by mouth once as needed. , Disp: , Rfl:     SUMAtriptan (IMITREX) 5 MG/ACT nasal spray, 1 spray by Nasal route daily as needed. , Disp: , Rfl:     QUEtiapine (SEROQUEL) 50 MG tablet, Take 50 mg by mouth nightly , Disp: , Rfl:     topiramate (TOPAMAX) 200 MG tablet, Take 200 mg by mouth daily , Disp: , Rfl:     Latex, Acyclovir, Albuterol, Astemizole, Eggs [egg white], Food color pink, Other, Penicillins, Sulfa antibiotics, Tobramycin-dexamethasone, and Codeine    There were no vitals filed for this visit. Review of Systems   Constitutional: Negative for chills, fever and unexpected weight change. HENT: Negative for mouth sores, sore throat and voice change. Eyes: Negative for pain, discharge and itching. Respiratory: Positive for chest tightness and shortness of breath. Negative for cough, choking and stridor. Cardiovascular: Positive for chest pain. Negative for palpitations and leg swelling. Gastrointestinal: Negative for abdominal pain, constipation and diarrhea. Endocrine: Negative for cold intolerance, heat intolerance and polydipsia. Genitourinary: Negative for dysuria, frequency and hematuria. Musculoskeletal: Negative for gait problem, joint swelling and neck stiffness. Neurological: Negative for dizziness, numbness and headaches. Psychiatric/Behavioral: Negative for agitation, confusion and hallucinations. ASSESSMENT:    1. Shortness of breath    2. Other chest pain    3. Severe persistent asthma without complication      PLAN:    -With normal PFTs it is difficult to attribute her ongoing dyspnea/chest pain to asthma.  Concern for alternate etiologies  -Check CXR, echo, stress test, refer to cardiology  -Continue bronchodilators, antiallergy treatment  -Not seen by a PCP in some time, encouraged her to seek follow up for extrathoracic etiologies to her symptoms   -Return to clinic after    Orders Placed This Encounter   Procedures    XR CHEST STANDARD (2 VW)   Alexandru Gomes MD, Cardiology, [facility] CARDIAC STRESS TEST EXERCISE ONLY    ECHO Complete 2D W Doppler Lynne Oates MD Chick File is a 63 y.o. female being evaluated by a Virtual Visit (video visit) encounter to address concerns as mentioned above. A caregiver was present when appropriate. Due to this being a TeleHealth encounter (During QGQSV-95 public health emergency), evaluation of the following organ systems was limited: Vitals/Constitutional/EENT/Resp/CV/GI//MS/Neuro/Skin/Heme-Lymph-Imm. Pursuant to the emergency declaration under the [address] [place] [address] and the [employer] Act, this Virtual Visit was conducted with patient's (and/or legal guardian's) consent, to reduce the patient's risk of exposure to COVID-19 and provide necessary medical care. The patient (and/or legal guardian) has also been advised to contact this office for worsening conditions or problems, and seek emergency medical treatment and/or call 911 if deemed necessary. Patient identification was verified at the start of the visit: Yes    Total time spent for this encounter: Not billed by time    Services were provided through a video synchronous discussion virtually to substitute for in-person clinic visit. Patient and provider were located at their individual homes. --Juma East MD on 1/13/2021 at 9:52 AM    An electronic signature was used to authenticate this note.

## 2021-01-18 ENCOUNTER — HOSPITAL ENCOUNTER (OUTPATIENT)
Age: 56
Discharge: HOME | End: 2021-01-18
Payer: MEDICAID

## 2021-01-18 ENCOUNTER — HOSPITAL ENCOUNTER (OUTPATIENT)
Dept: GENERAL RADIOLOGY | Age: 56
Discharge: HOME | End: 2021-01-18
Payer: MEDICAID

## 2021-01-18 ENCOUNTER — APPOINTMENT (OUTPATIENT)
Dept: GENERAL RADIOLOGY | Age: 56
End: 2021-01-18
Payer: MEDICAID

## 2021-01-18 DIAGNOSIS — R07.89 OTHER CHEST PAIN: ICD-10-CM

## 2021-01-18 DIAGNOSIS — R06.02 SHORTNESS OF BREATH: ICD-10-CM

## 2021-01-18 PROCEDURE — 71046 X-RAY EXAM CHEST 2 VIEWS: CPT

## 2021-01-21 ENCOUNTER — TELEPHONE (OUTPATIENT)
Dept: PULMONOLOGY | Age: 56
End: 2021-01-21

## 2021-01-21 DIAGNOSIS — R06.02 SHORTNESS OF BREATH: Primary | ICD-10-CM

## 2021-01-21 NOTE — TELEPHONE ENCOUNTER
Pt is requesting that Dr. Estela Carpenter order a COVID test for her. I told her we normally do not order COVID testing and that it should come from PCP. She states she does not have a PCP. I suggested she go to Urgent Care, but she wanted me to send a message anyway. Pt has been having migraines every day for the past week. She said she is very fatigued and her chest hurts. Please advise.

## 2021-01-22 ENCOUNTER — OFFICE VISIT (OUTPATIENT)
Dept: PRIMARY CARE CLINIC | Age: 56
End: 2021-01-22
Payer: MEDICAID

## 2021-01-22 DIAGNOSIS — Z11.59 SCREENING FOR VIRAL DISEASE: Primary | ICD-10-CM

## 2021-01-22 PROCEDURE — G8428 CUR MEDS NOT DOCUMENT: HCPCS | Performed by: NURSE PRACTITIONER

## 2021-01-22 PROCEDURE — G8421 BMI NOT CALCULATED: HCPCS | Performed by: NURSE PRACTITIONER

## 2021-01-22 PROCEDURE — 99211 OFF/OP EST MAY X REQ PHY/QHP: CPT | Performed by: NURSE PRACTITIONER

## 2021-01-22 NOTE — PATIENT INSTRUCTIONS

## 2021-01-22 NOTE — PROGRESS NOTES
Leopold Li received a viral test for COVID-19. They were educated on isolation and quarantine as appropriate. For any symptoms, they were directed to seek care from their PCP, given contact information to establish with a doctor, directed to an urgent care or the emergency room.

## 2021-01-23 LAB — SARS-COV-2, NAA: NOT DETECTED

## 2021-01-29 ENCOUNTER — HOSPITAL ENCOUNTER (OUTPATIENT)
Dept: NON INVASIVE DIAGNOSTICS | Age: 56
Discharge: HOME | End: 2021-01-29
Payer: MEDICAID

## 2021-01-29 DIAGNOSIS — R07.89 OTHER CHEST PAIN: ICD-10-CM

## 2021-01-29 DIAGNOSIS — R06.02 SHORTNESS OF BREATH: ICD-10-CM

## 2021-01-29 LAB
LV EF: 55 %
LVEF MODALITY: NORMAL

## 2021-01-29 PROCEDURE — 93306 TTE W/DOPPLER COMPLETE: CPT

## 2021-01-29 PROCEDURE — 93017 CV STRESS TEST TRACING ONLY: CPT

## 2021-02-11 NOTE — PROGRESS NOTES
[address]   Cardiac Consultation    Referring Provider:  No primary care provider on file. Chief Complaint   Patient presents with    New Patient    Chest Pain    Shortness of Breath        History of Present Illness:    Yohannes Rivera is a 63 y.o. who is here today as a NEW patient consult for cardiology, referred by Dr. Bethel Centeno, for shortness of breath and chest pain. Her echo from 1/29/2021 showed an EF of 55%. Stress test positive. Today she reports that a few months ago she began feeling chest tightness. Describes this as a squeezing or stabbing sensation that occurs randomly. Lasts a few seconds -1 minutes at a time. Does not radiate. Has associated SOB. Is non-exertional. Was able to shovel snow without symptoms. Patient currently denies any weight gain, edema, palpitations, dizziness, and syncope. Past Medical History:   has a past medical history of Asthma, Back pain, Headache, and Neck pain. Surgical History:   has a past surgical history that includes Neck surgery; Hysterectomy; Neck surgery; hip surgery; and Foot surgery. Social History:   reports that she quit smoking about 14 years ago. Her smoking use included cigarettes. She started smoking about 43 years ago. She has a 5.00 pack-year smoking history. She has never used smokeless tobacco. She reports previous alcohol use. She reports that she does not use drugs. Family History:  family history includes Heart Disease in her father and maternal grandmother; High Blood Pressure in her father. Home Medications:  Prior to Admission medications    Medication Sig Start Date End Date Taking? Authorizing Provider   Ketorolac Tromethamine (TORADOL IJ) Inject as directed as needed   Yes Historical Provider, MD   morphine (MS CONTIN) 30 MG extended release tablet Take 30 mg by mouth nightly.  11/30/20  Yes Historical Provider, MD   Biotin 1000 MCG TABS Take 1,000 mcg by mouth nightly   Yes Historical Provider, MD Vitamin D (CHOLECALCIFEROL) 25 MCG (1000 UT) TABS tablet Take 1,000 Units by mouth daily   Yes Historical Provider, MD   ketorolac (TORADOL) 10 MG tablet as needed 11/10/20  Yes Historical Provider, MD   montelukast (SINGULAIR) 10 MG tablet Take 1 tablet by mouth nightly 11/15/20  Yes Ronaldo Drummond MD   levalbuterol Simuel Zamarripa) 1.25 MG/3ML nebulizer solution Take 3 mLs by nebulization every 8 hours as needed for Wheezing or Shortness of Breath 6/10/20  Yes Ronaldo Drummond MD   tiotropium (SPIRIVA) 18 MCG inhalation capsule Inhale 18 mcg into the lungs daily    Yes Historical Provider, MD   tiZANidine (ZANAFLEX) 4 MG tablet Take 4 mg by mouth nightly  3/14/18  Yes Historical Provider, MD   omeprazole (PRILOSEC) 20 MG delayed release capsule Take 20 mg by mouth 2 times daily  5/30/17  Yes Historical Provider, MD   vitamin B-2 (RIBOFLAVIN) 100 MG TABS tablet  3/20/17  Yes Historical Provider, MD   cetirizine (ZYRTEC) 10 MG tablet Take 10 mg by mouth daily   Yes Historical Provider, MD   naloxegol (MOVANTIK) 25 MG TABS tablet Take 25 mg by mouth every morning   Yes Historical Provider, MD   Multiple Vitamins-Minerals (THERAPEUTIC MULTIVITAMIN-MINERALS) tablet Take 1 tablet by mouth daily   Yes Historical Provider, MD   Ascorbic Acid (VITAMIN C) 250 MG tablet Take 1,000 mg by mouth daily   Yes Historical Provider, MD   estrogens, conjugated, (PREMARIN) 1.25 MG tablet Take by mouth   Yes Historical Provider, MD   morphine (AVINZA) 60 MG extended release capsule Take 60 mg by mouth daily. Yes Historical Provider, MD   SUMAtriptan (IMITREX) 100 MG tablet Take 200 mg by mouth once as needed. Yes Historical Provider, MD   SUMAtriptan (IMITREX) 5 MG/ACT nasal spray 1 spray by Nasal route daily as needed.    Yes Historical Provider, MD   QUEtiapine (SEROQUEL) 50 MG tablet Take 50 mg by mouth nightly    Yes Historical Provider, MD   topiramate (TOPAMAX) 200 MG tablet Take 200 mg by mouth daily    Yes Historical Provider, MD

## 2021-02-12 ENCOUNTER — OFFICE VISIT (OUTPATIENT)
Dept: CARDIOLOGY CLINIC | Age: 56
End: 2021-02-12
Payer: MEDICAID

## 2021-02-12 VITALS
BODY MASS INDEX: 26.1 KG/M2 | HEART RATE: 73 BPM | DIASTOLIC BLOOD PRESSURE: 68 MMHG | OXYGEN SATURATION: 98 % | SYSTOLIC BLOOD PRESSURE: 122 MMHG | WEIGHT: 162.4 LBS | HEIGHT: 66 IN

## 2021-02-12 DIAGNOSIS — R07.2 PRECORDIAL PAIN: Primary | ICD-10-CM

## 2021-02-12 DIAGNOSIS — R06.02 SOB (SHORTNESS OF BREATH): ICD-10-CM

## 2021-02-12 DIAGNOSIS — I45.10 RBBB: ICD-10-CM

## 2021-02-12 PROCEDURE — G8419 CALC BMI OUT NRM PARAM NOF/U: HCPCS | Performed by: INTERNAL MEDICINE

## 2021-02-12 PROCEDURE — 99244 OFF/OP CNSLTJ NEW/EST MOD 40: CPT | Performed by: INTERNAL MEDICINE

## 2021-02-12 PROCEDURE — 93000 ELECTROCARDIOGRAM COMPLETE: CPT | Performed by: INTERNAL MEDICINE

## 2021-02-12 PROCEDURE — G8427 DOCREV CUR MEDS BY ELIG CLIN: HCPCS | Performed by: INTERNAL MEDICINE

## 2021-02-12 PROCEDURE — G8482 FLU IMMUNIZE ORDER/ADMIN: HCPCS | Performed by: INTERNAL MEDICINE

## 2021-02-12 NOTE — PATIENT INSTRUCTIONS
Plan:  Nunu Condon to risk stratify   Discussed the possibility of an angiogram  Follow up based on testing      Your provider has ordered testing for further evaluation. An order/prescription has been included in your paper work. ? To schedule outpatient testing, contact Central Scheduling by calling [address] [place] [address] ([phone]).

## 2021-03-10 ENCOUNTER — HOSPITAL ENCOUNTER (OUTPATIENT)
Dept: NUCLEAR MEDICINE | Age: 56
Discharge: HOME | End: 2021-03-10
Payer: MEDICAID

## 2021-03-10 ENCOUNTER — HOSPITAL ENCOUNTER (OUTPATIENT)
Dept: NON INVASIVE DIAGNOSTICS | Age: 56
Discharge: HOME | End: 2021-03-10
Payer: MEDICAID

## 2021-03-10 DIAGNOSIS — R07.2 PRECORDIAL PAIN: ICD-10-CM

## 2021-03-10 LAB
LV EF: 65 %
LVEF MODALITY: NORMAL

## 2021-03-10 PROCEDURE — 93017 CV STRESS TEST TRACING ONLY: CPT

## 2021-03-10 PROCEDURE — A9502 TC99M TETROFOSMIN: HCPCS | Performed by: INTERNAL MEDICINE

## 2021-03-10 PROCEDURE — 6360000002 HC RX W HCPCS: Performed by: INTERNAL MEDICINE

## 2021-03-10 PROCEDURE — 78452 HT MUSCLE IMAGE SPECT MULT: CPT

## 2021-03-10 PROCEDURE — 3430000000 HC RX DIAGNOSTIC RADIOPHARMACEUTICAL: Performed by: INTERNAL MEDICINE

## 2021-03-10 RX ADMIN — REGADENOSON 0.4 MG: 0.08 INJECTION, SOLUTION INTRAVENOUS at 09:20

## 2021-03-10 RX ADMIN — TETROFOSMIN 10.9 MILLICURIE: 1.38 INJECTION, POWDER, LYOPHILIZED, FOR SOLUTION INTRAVENOUS at 08:10

## 2021-03-10 RX ADMIN — TETROFOSMIN 31.7 MILLICURIE: 1.38 INJECTION, POWDER, LYOPHILIZED, FOR SOLUTION INTRAVENOUS at 09:21

## 2021-03-11 ENCOUNTER — TELEPHONE (OUTPATIENT)
Dept: CARDIOLOGY CLINIC | Age: 56
End: 2021-03-11

## 2021-03-11 NOTE — TELEPHONE ENCOUNTER
Spoke to pt. Let her know that per [facility] [place] confirms abnormality. [facility] [place] to get a cardiac cath. Pt would like RN [facility] to call her back to further discuss. Pt can be reached at [phone]. Pt is available before 2:50pm and after 4:30 pm. Pt is a  and working today.      TY

## 2021-03-12 NOTE — TELEPHONE ENCOUNTER
Spoke with patient. Patient is scheduled with Dr. Renan Garcia for Left Heart Cath on 3/19/21 (pending insurance) at [facility], arrival time of 6:30am to the Cath Lab. Please have patient arrive to the main entrance of [facility] and check in with the registration desk. Please call patient regarding medication instructions. Remind patient to be NPO after midnight (8 hours prior). Do not apply lotions/creams on skin the day of procedure. COVID testing 3/15. Should she stop taking Imitrex? Please advise when speaking with her.

## 2021-03-12 NOTE — TELEPHONE ENCOUNTER
LM on VM to be NPO, no lotions/creams, and okay to take sip of morning meds with a sip of water only

## 2021-03-12 NOTE — TELEPHONE ENCOUNTER
I spoke with patient regarding her testing and recommendations for cardiac cath. Patient states she has a allergy to IV dye which has resulted in seizure activity in the past. I added this to her allergy list. I will send to [facility] to schedule but do you have recommendations for premedication for allergy?

## 2021-03-12 NOTE — TELEPHONE ENCOUNTER
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

## 2021-03-15 ENCOUNTER — OFFICE VISIT (OUTPATIENT)
Dept: PRIMARY CARE CLINIC | Age: 56
End: 2021-03-15
Payer: MEDICAID

## 2021-03-15 DIAGNOSIS — Z01.818 PREOP TESTING: Primary | ICD-10-CM

## 2021-03-15 LAB — SARS-COV-2: NOT DETECTED

## 2021-03-15 PROCEDURE — G8428 CUR MEDS NOT DOCUMENT: HCPCS | Performed by: NURSE PRACTITIONER

## 2021-03-15 PROCEDURE — G8419 CALC BMI OUT NRM PARAM NOF/U: HCPCS | Performed by: NURSE PRACTITIONER

## 2021-03-15 PROCEDURE — 99211 OFF/OP EST MAY X REQ PHY/QHP: CPT | Performed by: NURSE PRACTITIONER

## 2021-03-15 NOTE — PROGRESS NOTES
Vivian Fenton received a viral test for COVID-19. They were educated on isolation and quarantine as appropriate. For any symptoms, they were directed to seek care from their PCP, given contact information to establish with a doctor, directed to an urgent care or the emergency room.

## 2021-03-19 ENCOUNTER — HOSPITAL ENCOUNTER (OUTPATIENT)
Dept: CARDIAC CATH/INVASIVE PROCEDURES | Age: 56
Discharge: HOME | End: 2021-03-19
Attending: INTERNAL MEDICINE | Admitting: INTERNAL MEDICINE
Payer: MEDICAID

## 2021-03-19 VITALS — WEIGHT: 164 LBS | BODY MASS INDEX: 26.36 KG/M2 | HEIGHT: 66 IN

## 2021-03-19 PROBLEM — R07.9 CHEST PAIN: Status: ACTIVE | Noted: 2017-03-31

## 2021-03-19 LAB
A/G RATIO: 1.8 (ref 1.1–2.2)
ALBUMIN SERPL-MCNC: 4.1 G/DL (ref 3.4–5)
ALP BLD-CCNC: 60 U/L (ref 40–129)
ALT SERPL-CCNC: 17 U/L (ref 10–40)
ANION GAP SERPL CALCULATED.3IONS-SCNC: 8 MMOL/L (ref 3–16)
AST SERPL-CCNC: 23 U/L (ref 15–37)
BILIRUB SERPL-MCNC: <0.2 MG/DL (ref 0–1)
BUN BLDV-MCNC: 22 MG/DL (ref 7–20)
CALCIUM SERPL-MCNC: 9 MG/DL (ref 8.3–10.6)
CHLORIDE BLD-SCNC: 108 MMOL/L (ref 99–110)
CHOLESTEROL, TOTAL: 218 MG/DL (ref 0–199)
CO2: 24 MMOL/L (ref 21–32)
CREAT SERPL-MCNC: 1.1 MG/DL (ref 0.6–1.1)
EKG ATRIAL RATE: 63 BPM
EKG DIAGNOSIS: NORMAL
EKG P AXIS: 64 DEGREES
EKG P-R INTERVAL: 148 MS
EKG Q-T INTERVAL: 444 MS
EKG QRS DURATION: 134 MS
EKG QTC CALCULATION (BAZETT): 454 MS
EKG R AXIS: 87 DEGREES
EKG T AXIS: 31 DEGREES
EKG VENTRICULAR RATE: 63 BPM
GFR AFRICAN AMERICAN: >60
GFR NON-AFRICAN AMERICAN: 51
GLOBULIN: 2.3 G/DL
GLUCOSE BLD-MCNC: 90 MG/DL (ref 70–99)
HCT VFR BLD CALC: 39.4 % (ref 36–48)
HDLC SERPL-MCNC: 73 MG/DL (ref 40–60)
HEMOGLOBIN: 13 G/DL (ref 12–16)
INR BLD: 0.88 (ref 0.86–1.14)
LDL CHOLESTEROL CALCULATED: 123 MG/DL
MCH RBC QN AUTO: 29.3 PG (ref 26–34)
MCHC RBC AUTO-ENTMCNC: 33.1 G/DL (ref 31–36)
MCV RBC AUTO: 88.5 FL (ref 80–100)
PDW BLD-RTO: 13.4 % (ref 12.4–15.4)
PLATELET # BLD: 182 K/UL (ref 135–450)
PMV BLD AUTO: 9.6 FL (ref 5–10.5)
POTASSIUM SERPL-SCNC: 3.6 MMOL/L (ref 3.5–5.1)
PROTHROMBIN TIME: 10.2 SEC (ref 10–13.2)
RBC # BLD: 4.45 M/UL (ref 4–5.2)
SODIUM BLD-SCNC: 140 MMOL/L (ref 136–145)
TOTAL PROTEIN: 6.4 G/DL (ref 6.4–8.2)
TRIGL SERPL-MCNC: 108 MG/DL (ref 0–150)
VLDLC SERPL CALC-MCNC: 22 MG/DL
WBC # BLD: 3.2 K/UL (ref 4–11)

## 2021-03-19 PROCEDURE — 93458 L HRT ARTERY/VENTRICLE ANGIO: CPT

## 2021-03-19 PROCEDURE — 85027 COMPLETE CBC AUTOMATED: CPT

## 2021-03-19 PROCEDURE — 93005 ELECTROCARDIOGRAM TRACING: CPT | Performed by: INTERNAL MEDICINE

## 2021-03-19 PROCEDURE — C1760 CLOSURE DEV, VASC: HCPCS

## 2021-03-19 PROCEDURE — C1769 GUIDE WIRE: HCPCS

## 2021-03-19 PROCEDURE — 85610 PROTHROMBIN TIME: CPT

## 2021-03-19 PROCEDURE — C1894 INTRO/SHEATH, NON-LASER: HCPCS

## 2021-03-19 PROCEDURE — 93458 L HRT ARTERY/VENTRICLE ANGIO: CPT | Performed by: INTERNAL MEDICINE

## 2021-03-19 PROCEDURE — 6360000004 HC RX CONTRAST MEDICATION

## 2021-03-19 PROCEDURE — 2709999900 HC NON-CHARGEABLE SUPPLY

## 2021-03-19 PROCEDURE — 93010 ELECTROCARDIOGRAM REPORT: CPT | Performed by: INTERNAL MEDICINE

## 2021-03-19 PROCEDURE — 2500000003 HC RX 250 WO HCPCS

## 2021-03-19 PROCEDURE — 80053 COMPREHEN METABOLIC PANEL: CPT

## 2021-03-19 PROCEDURE — 80061 LIPID PANEL: CPT

## 2021-03-19 PROCEDURE — 6370000000 HC RX 637 (ALT 250 FOR IP)

## 2021-03-19 PROCEDURE — 6360000002 HC RX W HCPCS

## 2021-03-19 RX ORDER — ATORVASTATIN CALCIUM 40 MG/1
40 TABLET, FILM COATED ORAL DAILY
Qty: 90 TABLET | Refills: 1 | Status: SHIPPED
Start: 2021-03-19 | End: 2021-04-09 | Stop reason: SINTOL

## 2021-03-19 RX ORDER — ACETAMINOPHEN 325 MG/1
650 TABLET ORAL EVERY 4 HOURS PRN
Status: CANCELLED | OUTPATIENT
Start: 2021-03-19

## 2021-03-19 RX ORDER — SODIUM CHLORIDE 9 MG/ML
1000 INJECTION, SOLUTION INTRAVENOUS CONTINUOUS
Status: DISCONTINUED | OUTPATIENT
Start: 2021-03-19 | End: 2021-03-19 | Stop reason: HOSPADM

## 2021-03-19 RX ORDER — ASPIRIN 325 MG
325 TABLET ORAL DAILY
Status: DISCONTINUED | OUTPATIENT
Start: 2021-03-19 | End: 2021-03-19 | Stop reason: HOSPADM

## 2021-03-19 RX ORDER — SODIUM CHLORIDE 0.9 % (FLUSH) 0.9 %
10 SYRINGE (ML) INJECTION PRN
Status: CANCELLED | OUTPATIENT
Start: 2021-03-19

## 2021-03-19 RX ORDER — MIDAZOLAM HYDROCHLORIDE 1 MG/ML
INJECTION INTRAMUSCULAR; INTRAVENOUS
Status: COMPLETED | OUTPATIENT
Start: 2021-03-19 | End: 2021-03-19

## 2021-03-19 RX ORDER — FENTANYL CITRATE 50 UG/ML
INJECTION, SOLUTION INTRAMUSCULAR; INTRAVENOUS
Status: COMPLETED | OUTPATIENT
Start: 2021-03-19 | End: 2021-03-19

## 2021-03-19 RX ORDER — METHYLPREDNISOLONE SODIUM SUCCINATE 125 MG/2ML
80 INJECTION, POWDER, LYOPHILIZED, FOR SOLUTION INTRAMUSCULAR; INTRAVENOUS ONCE
Status: COMPLETED | OUTPATIENT
Start: 2021-03-19 | End: 2021-03-19

## 2021-03-19 RX ORDER — SODIUM CHLORIDE 0.9 % (FLUSH) 0.9 %
10 SYRINGE (ML) INJECTION EVERY 12 HOURS SCHEDULED
Status: CANCELLED | OUTPATIENT
Start: 2021-03-19

## 2021-03-19 RX ORDER — DIPHENHYDRAMINE HYDROCHLORIDE 50 MG/ML
50 INJECTION INTRAMUSCULAR; INTRAVENOUS ONCE
Status: COMPLETED | OUTPATIENT
Start: 2021-03-19 | End: 2021-03-19

## 2021-03-19 RX ORDER — ASPIRIN 81 MG/1
81 TABLET ORAL DAILY
Qty: 90 TABLET | Refills: 3 | Status: SHIPPED | OUTPATIENT
Start: 2021-03-19 | End: 2022-03-03

## 2021-03-19 RX ORDER — SODIUM CHLORIDE 9 MG/ML
INJECTION, SOLUTION INTRAVENOUS CONTINUOUS
Status: ACTIVE | OUTPATIENT
Start: 2021-03-19 | End: 2021-03-19

## 2021-03-19 RX ADMIN — FENTANYL CITRATE 50 MCG: 50 INJECTION, SOLUTION INTRAMUSCULAR; INTRAVENOUS at 09:12

## 2021-03-19 RX ADMIN — FENTANYL CITRATE 25 MCG: 50 INJECTION, SOLUTION INTRAMUSCULAR; INTRAVENOUS at 09:19

## 2021-03-19 RX ADMIN — DIPHENHYDRAMINE HYDROCHLORIDE 50 MG: 50 INJECTION INTRAMUSCULAR; INTRAVENOUS at 08:11

## 2021-03-19 RX ADMIN — FENTANYL CITRATE 25 MCG: 50 INJECTION, SOLUTION INTRAMUSCULAR; INTRAVENOUS at 09:01

## 2021-03-19 RX ADMIN — Medication 325 MG: at 07:46

## 2021-03-19 RX ADMIN — MIDAZOLAM HYDROCHLORIDE 2 MG: 1 INJECTION INTRAMUSCULAR; INTRAVENOUS at 09:01

## 2021-03-19 RX ADMIN — METHYLPREDNISOLONE SODIUM SUCCINATE 80 MG: 125 INJECTION, POWDER, LYOPHILIZED, FOR SOLUTION INTRAMUSCULAR; INTRAVENOUS at 08:10

## 2021-03-19 RX ADMIN — MIDAZOLAM HYDROCHLORIDE 1 MG: 1 INJECTION INTRAMUSCULAR; INTRAVENOUS at 09:19

## 2021-03-19 RX ADMIN — MIDAZOLAM HYDROCHLORIDE 1 MG: 1 INJECTION INTRAMUSCULAR; INTRAVENOUS at 09:23

## 2021-03-19 NOTE — PROCEDURES
CARDIAC CATHETERIZATION REPORT    Date of Procedure: 3/19/2021  : Junie Nunes DO  Primary Indication: Chest pain, abnormal nuclear SPECT stress test    Procedures Performed:  1. Coronary angiography  2. Left heart catheterization  3. Ultrasound-guided right femoral artery access  4. Right femoral angiography  5. Angioseal closure of right femoral artery  6. Moderate conscious sedation    Procedural Details:  1. Access: Local anesthetic was given and access was initially attempted in the right radial artery. However, there was difficulty accessing the vessel which appeared to be very small on ultrasound and thus access was later transitioned to the right femoral artery. Additional local anesthetic was given and access was obtained in the right femoral artery using a micropuncture technique and ultrasound guidance and a 6F sheath was placed without difficulty. 2. Diagnostic: 5F JR4 and 5F JL4 catheters were used to perform selective right and left coronary angiography, respectively. The 5F JR4 catheter was used to perform the left heart catheterization. No significant gradient was observed on pull-back of the catheter across the aortic valve. 3. Hemostasis: At the end of the procedure, the the right femoral arterial sheath was removed and hemostasis was achieved using an Angioseal device. Findings:  1. Hemodynamics:     A. Opening arterial pressure: 96/56 (74) mmHg      B. LVEDP: 12 mmHg    2. Coronary anatomy:  A. Left main artery: The left main artery bifurcates into the left anterior descending artery and left circumflex artery. The left main artery has no angiographically significant disease. B. Left anterior descending artery: Transapical vessel which gives rise to 2 diagonal arteries. The LAD has a 20% ostial stenosis. The remainder of the vessel has minor luminal irregularities.   The first diagonal artery is a small to medium caliber vessel with no angiographically significant disease. The second diagonal artery is a small-medium caliber vessel with a 20% ostial stenosis. C. Left circumflex artery: Non-dominant vessel that gives rise to 3 obtuse marginal arteries. The LCX has no angiographically significant disease. The OM1 is a small vessel with a high origin and has no angiographically significant disease. The OM2 is a large branching vessel with no angiographically significant disease. The OM3 is a medium caliber branching vessel with no angiographically significant disease. D. Right coronary artery: Dominant vessel that gives rise to the posterior descending artery and posterolateral branch. The RCA has no angiographically significant disease. The posterior descending artery has no angiographically significant disaase. The RPLB has no angiographically significant disease. 3. Right femoral angiography:  A. The right common femoral artery is patent and bifurcates in the right superficial femoral artery and right profunda femoris artery below the level of the inferior margin of the femoral head. The sheath enters the right common femoral artery over the middle-third of the the femoral head. Technical Factors:  Complications: None. Estimated blood loss: Minimal.  Radiation: Air kerma 365 mGy and 2.1 minutes of fluoroscopy  Sedation: Moderate conscious sedation was administered by qaulified nursing personnel under continuous hemodynamic monitoring, starting at 9:00 AM and ending at 9:45 AM.  Medications: 4 mg IV Versed, 100 mcg IV Fentanyl  Contrast: 60 cc of Isovue     Impression:  1. Mild non-obstructive coronary artery disease. 2. Normal LVEDP. Plan:  1. Start aspirin 81 mg daily and atorvastatin 40 mg daily. 2. Optimal medical therapy for CAD risk factors. 3. Follow-up with [facility] in one month.        Ashish [address]

## 2021-03-19 NOTE — LETTER
[address] [address]  Phone: [phone]  Fax: [phone]    [facility] CATH LAB ROOM 1        March 19, 2021     Patient: Flower Renteria   YOB: 1965   Date of Visit: 3/19/2021       To Whom It May Concern: It is my medical opinion that Martin Fuchs may return to work on Wednesday, March 24th, 2021 with the following restrictions: No restrictions . If you have any questions or concerns, please don't hesitate to call.     Sincerely,        SCHEDULE, [facility] CATH LAB ROOM 1

## 2021-03-19 NOTE — H&P
Brief Pre-Op Note/Sedation Assessment      Melissa Long  1965  Cath Pool Rm/NONE      [id number]  9:07 AM    Planned Procedure: Cardiac Catheterization Procedure    Post Procedure Plan: Return to same level of care    Consent: I have discussed with the patient and/or the patient representative the indication, alternatives, and the possible risks and/or complications of the planned procedure and the anesthesia methods. The patient and/or patient representative appear to understand and agree to proceed. Chief Complaint: Chest Pain/Pressure, abnormal nuclear SPECT stress test      Indications for Cath Procedure:  New Onset Angina <= 2 months and Suspected CAD  Anginal Classification within 2 weeks:  CCS III - Symptoms with everyday living activities, i.e., moderate limitation  NYHA Heart Failure Class within 2 weeks: No symptoms  Is Cath Lab Visit Valve-related?: No  Surgical Risk: Low  Functional Type: < 4 METS    Anti- Anginal Meds within 2 weeks:   Yes: Aspirin    Stress or Imaging Studies Performed (within 6 months):  Stress Test with SPECT Result: Positive:  anterior Risk/Extent of Ischemia:  Intermediate     Vital Signs:  Ht 5' 6\" (1.676 m)   Wt 164 lb (74.4 kg)   BMI 26.47 kg/m²     Allergies: Allergies   Allergen Reactions    Latex Swelling and Rash    Acyclovir Swelling    Albuterol Other (See Comments)     Tachycardia/ palpitations    Astemizole      Other reaction(s): Vomiting    Eggs [Egg White]     Food Color Pink Other (See Comments)     Seizure with Myelogram dye per pt    Iv Dye [Iodides]      Per patient causes seizure activity.      Other     Penicillins Hives    Sulfa Antibiotics Hives    Tobramycin-Dexamethasone     Codeine Nausea And Vomiting     Other reaction(s): Vomiting       Past Medical History:  Past Medical History:   Diagnosis Date    Asthma     Back pain     Headache     Neck pain          Surgical History:  Past Surgical History:   Procedure Laterality Date    FOOT SURGERY      right    HIP SURGERY      HYSTERECTOMY      NECK SURGERY      NECK SURGERY           Medications:  Current Facility-Administered Medications   Medication Dose Route Frequency Provider Last Rate Last Admin    0.9 % sodium chloride infusion  1,000 mL Intravenous Continuous Jese Vaughn DO        aspirin tablet 325 mg  325 mg Oral Daily Jese Vaughn DO   325 mg at 03/19/21 0790           Pre-Sedation:    Pre-Sedation Documentation and Exam:  I have personally completed a history, physical exam & review of systems for this patient (see notes). Prior History of Anesthesia Complications:   none    Modified Mallampati:  II (soft palate, uvula, fauces visible)    ASA Classification:  Class 3 - A patient with severe systemic disease that limits activity but is not incapacitating      Frankie Scale: Activity:  2 - Able to move 4 extremities voluntarily on command  Respiration:  2 - Able to breathe deeply and cough freely  Circulation:  2 - BP+/- 20mmHg of normal  Consciousness:  2 - Fully awake  Oxygen Saturation (color):  2 - Able to maintain oxygen saturation >92% on room air    Sedation/Anesthesia Plan:  Guard the patient's safety and welfare. Minimize physical discomfort and pain. Minimize negative psychological responses to treatment by providing sedation and analgesia and maximize the potential amnesia. Patient to meet pre-procedure discharge plan.     Medication Planned:  midazolam intravenously and fentanyl intravenously    Patient is an appropriate candidate for plan of sedation: yes      Electronically signed by Blaire Vaughn DO on 3/19/2021 at 9:07 AM

## 2021-03-23 ENCOUNTER — TELEPHONE (OUTPATIENT)
Dept: CARDIOLOGY CLINIC | Age: 56
End: 2021-03-23

## 2021-03-23 NOTE — TELEPHONE ENCOUNTER
Called and spoke with patient. She has had some pain when she walks at her recent right groin access site. She says the site is minimally swollen and does not appear different than it did when she was discharged, at which time there was no hematoma. She says the site is soft to touch and there is no erythema. I would like her to come in to be evaluated prior to her appointment on 4/9/21. Gustavo Khan, can we get her an appointment with me on Monday 3/29 so that her site can be physically examined. In the meantime, she was advised to go to the ED if her pain worsens or her groin becomes more swollen.

## 2021-03-23 NOTE — TELEPHONE ENCOUNTER
Pt calling had a Cath done on 3.19.21 by [facility]. States she is having pain @ the groin site. Per pt site is a little swollen, but not much. NO redness and NOT warm/hot to the touch. But, is very very sore. She is scheduled to see [facility] on 4.9.21    Pt is asking what can she do? And, how long should she be experiencing pain?

## 2021-03-23 NOTE — TELEPHONE ENCOUNTER
I called patient to get more details. Left her a VM but wanted you to know she is having pain at her cath site.

## 2021-03-29 ENCOUNTER — OFFICE VISIT (OUTPATIENT)
Dept: CARDIOLOGY CLINIC | Age: 56
End: 2021-03-29
Payer: MEDICAID

## 2021-03-29 DIAGNOSIS — R10.31 RIGHT GROIN PAIN: Primary | ICD-10-CM

## 2021-03-29 PROCEDURE — G8419 CALC BMI OUT NRM PARAM NOF/U: HCPCS | Performed by: INTERNAL MEDICINE

## 2021-03-29 PROCEDURE — G8428 CUR MEDS NOT DOCUMENT: HCPCS | Performed by: INTERNAL MEDICINE

## 2021-03-29 PROCEDURE — 99211 OFF/OP EST MAY X REQ PHY/QHP: CPT | Performed by: INTERNAL MEDICINE

## 2021-03-29 NOTE — LETTER
Kaylee MEHRAN [address] [facility]  Phone: [phone]  Fax: [phone]    Margy Quezada, DO      March 29, 2021     [address]      To whom it may concern,     Please excuse Marylee Kong from work on 3/25, 3/26 and 3/29. If you have any questions or concerns, please don't hesitate to call.     Sincerely,        Evia Frankel Haddox, DO

## 2021-03-30 NOTE — PROGRESS NOTES
Patient presents for a right groin access site check. She underwent invasive coronary angiography via a right femoral artery approach on 3/19/21 which revealed mild non-obstructive coronary artery disease. There were no access site complications noted at the time of her procedure and at the completion of the procedure, the right femoral arteriotomy site was closed with an Angioseal device. She reports that following her procedure, she had additional pain and bruising at her right groin which has now improved. On exam, her right femoral artery access site is tender to palpation, but soft and without evidence of hematoma. There are resolving ecchymoses present along the right groin and right thigh. Right femoral artery pulse and distal RLE pulses are 2+. No bruit is appreciated. Overall, there is relatively low concern for retroperitoneal hematoma, pseudoaneurysm, or AV fistula at this this time. Will continue with conservative measures for now. She is already receiving PO morphine, NSAIDs, and tizanidine from her PCP for treatment of orthopaedic issues and has also been taking tylenol as needed. She was instructed to notify our clinic if she has any worsening pain or swelling in her right groin. She has additional follow-up scheduled with Dr. Zahra Hahn on 4/9/21.

## 2021-04-08 NOTE — PROGRESS NOTES
[address]   Cardiac Consultation    Referring Provider:  No primary care provider on file. Chief Complaint   Patient presents with    Follow-up        History of Present Illness:    Estefania Brannon is a 63 y.o. who is here today for follow up for chest pain and SOB. Her echo from 1/29/2021 showed an EF of 55%. She had an abnormal stress test and then a left heart cath 3/19/2021 which showed mild non-obstructive coronary artery disease. (via a right femoral artery approach). She presented to the office on 3/29/2021 with tenderness at her right femoral site. There are resolving ecchymoses present along the right groin and right thigh. Right femoral artery pulse and distal RLE pulses are 2+. No bruit is appreciated. Today she states she has been feeling well overall. She states she stopped her Lipitor due to muscle cramps which resolved off the medication. She continues to have a bruise at her femoral site but it is decreasing in size. Pain decreased well. Patient currently denies any weight gain, edema, palpitations, chest pain, shortness of breath, dizziness, and syncope. Past Medical History:   has a past medical history of Asthma, Back pain, Headache, and Neck pain. Surgical History:   has a past surgical history that includes Neck surgery; Hysterectomy; Neck surgery; hip surgery; and Foot surgery. Social History:   reports that she quit smoking about 14 years ago. Her smoking use included cigarettes. She started smoking about 43 years ago. She has a 5.00 pack-year smoking history. She has never used smokeless tobacco. She reports previous alcohol use. She reports that she does not use drugs. Family History:  family history includes Heart Disease in her father and maternal grandmother; High Blood Pressure in her father. Home Medications:  Prior to Admission medications    Medication Sig Start Date End Date Taking?  Authorizing Provider   aspirin EC 81 MG EC tablet Take 1 tablet by mouth daily 3/19/21  Yes Jese Vaughn, DO   Ketorolac Tromethamine (TORADOL IJ) Inject as directed as needed   Yes Historical Provider, MD   morphine (MS CONTIN) 30 MG extended release tablet Take 30 mg by mouth nightly.  11/30/20  Yes Historical Provider, MD   Biotin 1000 MCG TABS Take 1,000 mcg by mouth nightly   Yes Historical Provider, MD   Vitamin D (CHOLECALCIFEROL) 25 MCG (1000 UT) TABS tablet Take 1,000 Units by mouth daily   Yes Historical Provider, MD   ketorolac (TORADOL) 10 MG tablet as needed 11/10/20  Yes Historical Provider, MD   montelukast (SINGULAIR) 10 MG tablet Take 1 tablet by mouth nightly 11/15/20  Yes Siria Sanchez MD   levalbuterol Radha Pauling) 1.25 MG/3ML nebulizer solution Take 3 mLs by nebulization every 8 hours as needed for Wheezing or Shortness of Breath 6/10/20  Yes Siria Sanchez MD   ipratropium (ATROVENT) 0.02 % nebulizer solution Take 2.5 mLs by nebulization 4 times daily 4/2/19  Yes Siria Sanchez MD   tiotropium (SPIRIVA) 18 MCG inhalation capsule Inhale 18 mcg into the lungs daily    Yes Historical Provider, MD   tiZANidine (ZANAFLEX) 4 MG tablet Take 4 mg by mouth nightly  3/14/18  Yes Historical Provider, MD   omeprazole (PRILOSEC) 20 MG delayed release capsule Take 20 mg by mouth 2 times daily  5/30/17  Yes Historical Provider, MD   vitamin B-2 (RIBOFLAVIN) 100 MG TABS tablet  3/20/17  Yes Historical Provider, MD   cetirizine (ZYRTEC) 10 MG tablet Take 10 mg by mouth daily   Yes Historical Provider, MD   naloxegol (MOVANTIK) 25 MG TABS tablet Take 25 mg by mouth every morning   Yes Historical Provider, MD   Multiple Vitamins-Minerals (THERAPEUTIC MULTIVITAMIN-MINERALS) tablet Take 1 tablet by mouth daily   Yes Historical Provider, MD   Ascorbic Acid (VITAMIN C) 250 MG tablet Take 1,000 mg by mouth daily   Yes Historical Provider, MD   estrogens, conjugated, (PREMARIN) 1.25 MG tablet Take by mouth   Yes Historical Provider, MD   morphine (AVINZA) 60 MG extended release capsule Take 60 mg by mouth daily. Yes Historical Provider, MD   SUMAtriptan (IMITREX) 100 MG tablet Take 200 mg by mouth once as needed. Yes Historical Provider, MD   SUMAtriptan (IMITREX) 5 MG/ACT nasal spray 1 spray by Nasal route daily as needed. Yes Historical Provider, MD   QUEtiapine (SEROQUEL) 50 MG tablet Take 50 mg by mouth nightly    Yes Historical Provider, MD   topiramate (TOPAMAX) 200 MG tablet Take 200 mg by mouth daily    Yes Historical Provider, MD   atorvastatin (LIPITOR) 40 MG tablet Take 1 tablet by mouth daily  Patient not taking: Reported on 4/9/2021 3/19/21   Jese Pratherdomelia,    Erenumab-aooe (AIMOVIG SC) Inject into the skin every 30 days    Historical Provider, MD        Allergies:  Latex, Acyclovir, Albuterol, Astemizole, Eggs [egg white], Food color pink, Iv dye [iodides], Other, Penicillins, Sulfa antibiotics, Tobramycin-dexamethasone, and Codeine     Review of Systems:   · Constitutional: there has been no unanticipated weight loss. There's been no change in energy level, sleep pattern, or activity level. · Eyes: No visual changes or diplopia. No scleral icterus. · ENT: No Headaches, hearing loss or vertigo. No mouth sores or sore throat. · Cardiovascular: Reviewed in HPI  · Respiratory: No cough or wheezing, no sputum production. No hematemesis. · Gastrointestinal: No abdominal pain, appetite loss, blood in stools. No change in bowel or bladder habits. · Genitourinary: No dysuria, trouble voiding, or hematuria. · Musculoskeletal:  No gait disturbance, weakness or joint complaints. · Integumentary: No rash or pruritis. · Neurological: No headache, diplopia, change in muscle strength, numbness or tingling. No change in gait, balance, coordination, mood, affect, memory, mentation, behavior. · Psychiatric: No anxiety, no depression. · Endocrine: No malaise, fatigue or temperature intolerance. No excessive thirst, fluid intake, or urination.  No tremor. · Hematologic/Lymphatic: No abnormal bruising or bleeding, blood clots or swollen lymph nodes. · Allergic/Immunologic: No nasal congestion or hives. Physical Examination:    Vitals:    04/09/21 0915   BP: 108/66   Pulse: 79   SpO2: 92%        Constitutional and General Appearance: NAD   Respiratory:  · Normal excursion and expansion without use of accessory muscles  · Resp Auscultation: Normal breath sounds without dullness  Cardiovascular:  · The apical impulses not displaced  · Heart tones are crisp and normal  · Cervical veins are not engorged  · The carotid upstroke is normal in amplitude and contour without delay or bruit  · Normal S1S2, No S3, No Murmur  · Peripheral pulses are symmetrical and full  · There is no clubbing, cyanosis of the extremities. · No edema  · Femoral Arteries: 2+ and equal  · Pedal Pulses: 2+ and equal   Abdomen:  · No masses or tenderness  · Liver/Spleen: No Abnormalities Noted  Neurological/Psychiatric:  · Alert and oriented in all spheres  · Moves all extremities well  · Exhibits normal gait balance and coordination  · No abnormalities of mood, affect, memory, mentation, or behavior are noted    ECHO 1/29/2021  Summary  LV systolic function is normal with EF estimated at 55%. No regional wall motion abnormalities. Normal left ventricular diastolic filling pressure. Mild mitral and tricuspid regurgitation. Inadequate tricuspid regurgitation jet to estimate systolic artery pressure  (SPAP). Stress test 1/29/2021  Summary  Abnormal stress EKG suggestive of exercise induced ischemia. Left heart cath Dr. Dada Bauman 3/19/2021  Impression:  1. Mild non-obstructive coronary artery disease. 2. Normal LVEDP. Assessment:   Abnormal GXT stress test - reviewed w/ pt. False poaitive  CAD - stable. nonobstructive  Chest pain - atypical. Appears not cardiac.  No recent   SOB - primarily due to asthma   RBBB  Leg cramps on Lipitor - resolved post stopped   Preoperative cardiac risk assessment for shoulder surgery - low risk    Plan:  Ok for shoulder surgery- you can stop Aspirin as directed by surgeon   Stop Lipitor due to leg cramps   Start Crestor 10 mg daily- call if you start to have muscle aches or cramps (you can try taking COQ 10 along with your Crestor to decrease side effects)  Repeat fasting lipids in 3 months   Continue other medications   Check blood pressure at home weekly  Regular exercise and following a healthy diet encouraged   Follow up with me in 6 months     Scribe's attestation: This note was scribed in the presence of Dr. Danny Sullivan M.D. By Rudy Reyes RN    The scribes documentation has been prepared under my direction and personally reviewed by me in its entirety. I confirm that the note above accurately reflects all work, treatment, procedures, and medical decision making performed by me. Dr. Danny Sullivan MD        Thank you for allowing me to participate in the care of this individual.      Promise Avery.  Ember Pearson M.D., Audra Dancer

## 2021-04-09 ENCOUNTER — OFFICE VISIT (OUTPATIENT)
Dept: CARDIOLOGY CLINIC | Age: 56
End: 2021-04-09
Payer: MEDICAID

## 2021-04-09 ENCOUNTER — OFFICE VISIT (OUTPATIENT)
Dept: PRIMARY CARE CLINIC | Age: 56
End: 2021-04-09
Payer: MEDICAID

## 2021-04-09 VITALS
HEIGHT: 66 IN | HEART RATE: 79 BPM | OXYGEN SATURATION: 92 % | WEIGHT: 168 LBS | BODY MASS INDEX: 27 KG/M2 | SYSTOLIC BLOOD PRESSURE: 108 MMHG | DIASTOLIC BLOOD PRESSURE: 66 MMHG

## 2021-04-09 DIAGNOSIS — R94.39 ABNORMAL STRESS TEST: ICD-10-CM

## 2021-04-09 DIAGNOSIS — R06.02 SOB (SHORTNESS OF BREATH): ICD-10-CM

## 2021-04-09 DIAGNOSIS — R07.2 PRECORDIAL PAIN: Primary | ICD-10-CM

## 2021-04-09 DIAGNOSIS — I45.10 RBBB: ICD-10-CM

## 2021-04-09 DIAGNOSIS — E78.2 MIXED HYPERLIPIDEMIA: ICD-10-CM

## 2021-04-09 DIAGNOSIS — Z01.818 PREOP TESTING: Primary | ICD-10-CM

## 2021-04-09 PROCEDURE — G8419 CALC BMI OUT NRM PARAM NOF/U: HCPCS | Performed by: NURSE PRACTITIONER

## 2021-04-09 PROCEDURE — 1036F TOBACCO NON-USER: CPT | Performed by: INTERNAL MEDICINE

## 2021-04-09 PROCEDURE — 99214 OFFICE O/P EST MOD 30 MIN: CPT | Performed by: INTERNAL MEDICINE

## 2021-04-09 PROCEDURE — 99211 OFF/OP EST MAY X REQ PHY/QHP: CPT | Performed by: NURSE PRACTITIONER

## 2021-04-09 PROCEDURE — G8427 DOCREV CUR MEDS BY ELIG CLIN: HCPCS | Performed by: INTERNAL MEDICINE

## 2021-04-09 PROCEDURE — G8428 CUR MEDS NOT DOCUMENT: HCPCS | Performed by: NURSE PRACTITIONER

## 2021-04-09 PROCEDURE — G8419 CALC BMI OUT NRM PARAM NOF/U: HCPCS | Performed by: INTERNAL MEDICINE

## 2021-04-09 PROCEDURE — 3017F COLORECTAL CA SCREEN DOC REV: CPT | Performed by: INTERNAL MEDICINE

## 2021-04-09 RX ORDER — ROSUVASTATIN CALCIUM 10 MG/1
10 TABLET, COATED ORAL DAILY
Qty: 30 TABLET | Refills: 11 | Status: SHIPPED | OUTPATIENT
Start: 2021-04-09 | End: 2021-10-15

## 2021-04-09 NOTE — PATIENT INSTRUCTIONS
Plan:  [id number] [address] for shoulder surgery- you can stop Aspirin as directed by surgeon   Stop Lipitor due to leg cramps   Start Crestor 10 mg daily- call if you start to have muscle aches or cramps (you can try taking COQ 10 along with your Crestor to decrease side effects)  Repeat fasting lipids in 3 months   Continue other medications   Check blood pressure at home weekly  Regular exercise and following a healthy diet encouraged   Follow up with me in 6 months

## 2021-04-09 NOTE — LETTER
[address] [place]  Phone: [phone]  Fax: [phone]    Latonia Bermeo MD        April 9, 2021     [address] [place] [address]  1965      To whom it may concern,            Akash Arguelles has no cardiac contraindications to surgery. She can stop Aspirin if needed. If you have any questions or concerns, please don't hesitate to call.     Sincerely,        Latonia Bermeo MD

## 2021-04-09 NOTE — PATIENT INSTRUCTIONS

## 2021-04-09 NOTE — PROGRESS NOTES
Vinicio Wang received a viral test for COVID-19. They were educated on isolation and quarantine as appropriate. For any symptoms, they were directed to seek care from their PCP, given contact information to establish with a doctor, directed to an urgent care or the emergency room.

## 2021-04-10 LAB — SARS-COV-2: NOT DETECTED

## 2021-04-12 ENCOUNTER — TELEPHONE (OUTPATIENT)
Dept: PULMONOLOGY | Age: 56
End: 2021-04-12

## 2021-04-12 NOTE — TELEPHONE ENCOUNTER
PT. NEEDS STATEMENT SAYING ITS OK FOR HER TO HAVE SHOULDER SURGERY. iT IS SCHEDULED FOR 5/28/21. PLEASE ADVISE.

## 2021-04-12 NOTE — TELEPHONE ENCOUNTER
This patient has not been seen since 1/21. She requires an appointment for preop physical.    SW pt and scheduled VV with Dr. Christiana Max on 5/3/21.

## 2021-05-03 ENCOUNTER — VIRTUAL VISIT (OUTPATIENT)
Dept: PULMONOLOGY | Age: 56
End: 2021-05-03
Payer: MEDICAID

## 2021-05-03 DIAGNOSIS — J45.50 SEVERE PERSISTENT ASTHMA WITHOUT COMPLICATION: Primary | ICD-10-CM

## 2021-05-03 PROCEDURE — 3017F COLORECTAL CA SCREEN DOC REV: CPT | Performed by: INTERNAL MEDICINE

## 2021-05-03 PROCEDURE — 99213 OFFICE O/P EST LOW 20 MIN: CPT | Performed by: INTERNAL MEDICINE

## 2021-05-03 PROCEDURE — G8427 DOCREV CUR MEDS BY ELIG CLIN: HCPCS | Performed by: INTERNAL MEDICINE

## 2021-05-03 ASSESSMENT — ENCOUNTER SYMPTOMS
CHEST TIGHTNESS: 0
DIARRHEA: 0
VOICE CHANGE: 0
SORE THROAT: 0
CHOKING: 0
EYE ITCHING: 0
STRIDOR: 0
EYE DISCHARGE: 0
CONSTIPATION: 0
ABDOMINAL PAIN: 0
EYE PAIN: 0

## 2021-05-03 NOTE — PROGRESS NOTES
MA Communication: The following orders are received by verbal communication from Dr. Nancy Vernon.     Orders include:    6 month: 11/03/2021 11:00 am    Letter sent to [facility] surgery clearance [phone]

## 2021-05-03 NOTE — PROGRESS NOTES
Pulmonary Outpatient Note   Nancy Vernon MD       5/3/2021    Chief Complaint:  Follow-up (Pre-op right shoulder surgery, scheduled 2021 Dr. Marion Hankins)     HPI:   63y.o. year old female here for preop pulm eval for surgery. Virtual visit through [facility]. me    Going to have right shoulder surgery  She had normal PFTs with 97% lung function in December  No respiratory complaints at this time   Had workup for persistent chest pain and underwent a cath showing CAD that she is on medical management through cardiology   No acute exacs of her asthma since last seen.      Past Medical History:   Diagnosis Date    Asthma     Back pain     Headache     Neck pain        Past Surgical History:   Procedure Laterality Date    FOOT SURGERY      right    HIP SURGERY      HYSTERECTOMY      NECK SURGERY      NECK SURGERY         Social History     Tobacco Use    Smoking status: Former Smoker     Packs/day: 0.50     Years: 10.00     Pack years: 5.00     Types: Cigarettes     Start date: 1977     Quit date: 2007     Years since quittin.3    Smokeless tobacco: Never Used   Substance Use Topics    Alcohol use: Not Currently       Family History   Problem Relation Age of Onset    High Blood Pressure Father     Heart Disease Father         aorta ruptured    Heart Disease Maternal Grandmother         pacemaker         Current Outpatient Medications:     aspirin EC 81 MG EC tablet, Take 1 tablet by mouth daily, Disp: 90 tablet, Rfl: 3    Ketorolac Tromethamine (TORADOL IJ), Inject as directed as needed, Disp: , Rfl:     morphine (MS CONTIN) 30 MG extended release tablet, Take 30 mg by mouth nightly., Disp: , Rfl:     Biotin 1000 MCG TABS, Take 1,000 mcg by mouth nightly, Disp: , Rfl:     Vitamin D (CHOLECALCIFEROL) 25 MCG (1000 UT) TABS tablet, Take 1,000 Units by mouth daily, Disp: , Rfl:     ketorolac (TORADOL) 10 MG tablet, as needed, Disp: , Rfl:     montelukast (SINGULAIR) 10 MG tablet, Take 1 antibiotics, Tobramycin-dexamethasone, and Codeine    There were no vitals filed for this visit. Review of Systems   Constitutional: Negative for chills, fever and unexpected weight change. HENT: Negative for mouth sores, sore throat and voice change. Eyes: Negative for pain, discharge and itching. Respiratory: Negative for choking, chest tightness and stridor. Cardiovascular: Negative for chest pain, palpitations and leg swelling. Gastrointestinal: Negative for abdominal pain, constipation and diarrhea. Endocrine: Negative for cold intolerance, heat intolerance and polydipsia. Genitourinary: Negative for dysuria, frequency and hematuria. Musculoskeletal: Negative for gait problem, joint swelling and neck stiffness. Neurological: Negative for dizziness, numbness and headaches. Psychiatric/Behavioral: Negative for agitation, confusion and hallucinations. ASSESSMENT:    1. Severe persistent asthma without complication      PLAN:    -Low risk from a pulm standpoint and can proceed with surgery  -Continue inhaled regimen   -Appreciate cardiology input on her symptoms and the associated CAD findings/management  -Return to clinic sooner if symptoms worsen         Tarik Cotter, was evaluated through a synchronous (real-time) audio-video encounter. The patient (or guardian if applicable) is aware that this is a billable service. Verbal consent to proceed has been obtained within the past 12 months. The visit was conducted pursuant to the emergency declaration under the [facility], [address] [place] [address] authority and the [employer] General Act. Patient identification was verified, and a caregiver was present when appropriate. The patient was located in a state where the provider was credentialed to provide care.     Total time spent for this encounter: Not billed by time    --Lissette Santos MD on 5/3/2021 at 1:33 PM    An electronic signature was used to authenticate this note.

## 2021-08-16 ENCOUNTER — HOSPITAL ENCOUNTER (OUTPATIENT)
Dept: MAMMOGRAPHY | Age: 56
Discharge: HOME | End: 2021-08-16
Payer: MEDICAID

## 2021-08-16 DIAGNOSIS — Z12.31 BREAST CANCER SCREENING BY MAMMOGRAM: ICD-10-CM

## 2021-08-16 PROCEDURE — 77067 SCR MAMMO BI INCL CAD: CPT

## 2021-08-26 ENCOUNTER — HOSPITAL ENCOUNTER (OUTPATIENT)
Dept: MAMMOGRAPHY | Age: 56
Discharge: HOME | End: 2021-08-26
Payer: MEDICAID

## 2021-08-26 ENCOUNTER — HOSPITAL ENCOUNTER (OUTPATIENT)
Dept: ULTRASOUND IMAGING | Age: 56
Discharge: HOME | End: 2021-08-26
Payer: MEDICAID

## 2021-08-26 DIAGNOSIS — R92.8 ABNORMAL MAMMOGRAM: ICD-10-CM

## 2021-08-26 PROCEDURE — G0279 TOMOSYNTHESIS, MAMMO: HCPCS

## 2021-08-26 PROCEDURE — 76642 ULTRASOUND BREAST LIMITED: CPT

## 2021-10-15 ENCOUNTER — OFFICE VISIT (OUTPATIENT)
Dept: CARDIOLOGY CLINIC | Age: 56
End: 2021-10-15
Payer: MEDICAID

## 2021-10-15 VITALS
OXYGEN SATURATION: 97 % | HEART RATE: 89 BPM | BODY MASS INDEX: 25.36 KG/M2 | WEIGHT: 157.8 LBS | SYSTOLIC BLOOD PRESSURE: 114 MMHG | DIASTOLIC BLOOD PRESSURE: 82 MMHG | HEIGHT: 66 IN

## 2021-10-15 DIAGNOSIS — R94.39 ABNORMAL STRESS TEST: ICD-10-CM

## 2021-10-15 DIAGNOSIS — R06.02 SOB (SHORTNESS OF BREATH): Primary | ICD-10-CM

## 2021-10-15 DIAGNOSIS — E78.2 MIXED HYPERLIPIDEMIA: ICD-10-CM

## 2021-10-15 PROCEDURE — G8427 DOCREV CUR MEDS BY ELIG CLIN: HCPCS | Performed by: INTERNAL MEDICINE

## 2021-10-15 PROCEDURE — G8419 CALC BMI OUT NRM PARAM NOF/U: HCPCS | Performed by: INTERNAL MEDICINE

## 2021-10-15 PROCEDURE — 99214 OFFICE O/P EST MOD 30 MIN: CPT | Performed by: INTERNAL MEDICINE

## 2021-10-15 PROCEDURE — G8484 FLU IMMUNIZE NO ADMIN: HCPCS | Performed by: INTERNAL MEDICINE

## 2021-10-15 PROCEDURE — 1036F TOBACCO NON-USER: CPT | Performed by: INTERNAL MEDICINE

## 2021-10-15 PROCEDURE — 3017F COLORECTAL CA SCREEN DOC REV: CPT | Performed by: INTERNAL MEDICINE

## 2021-10-15 RX ORDER — FERROUS SULFATE 325(65) MG
325 TABLET ORAL
COMMUNITY
End: 2022-10-13

## 2021-10-15 NOTE — PROGRESS NOTES
[facility]   Cardiac Consultation    Referring Provider:  Shannon Navarro     Chief Complaint   Patient presents with    Follow-up    Shortness of Breath     asthma      Pantera Paredes   1965      History of Present Illness:    Pantera Paredes is a 61 y.o. who is here today for follow up for chest pain and SOB. Her echo from 1/29/2021 showed an EF of 55%. She had an abnormal stress test and then a left heart cath 3/19/2021 which showed mild non-obstructive coronary artery disease. (via a right femoral artery approach). She presented to the office on 3/29/2021 with tenderness at her right femoral site. There are resolving ecchymoses present along the right groin and right thigh. Right femoral artery pulse and distal RLE pulses are 2+. No bruit is appreciated. Today she states she has been feeling well overall but is having some issues with her asthma today and has been having SOB. She plans to go home and use her rescue inhaler. No fevers. She states she stopped her Statin therapy, Crestor, due to nausea which resolved off the medication. She continues to taking aspirin 81 mg daily and seems to be tolerating this well. She has also been attempting to eat a healthier diet. She has lost 11 lbs and is working out on a regular basis. She has not been eating red meat. Patient currently denies any weight gain, edema, palpitations, chest pain, dizziness, and syncope. Past Medical History:   has a past medical history of Asthma, Back pain, Headache, and Neck pain. Surgical History:   has a past surgical history that includes Neck surgery; Hysterectomy; Neck surgery; hip surgery; and Foot surgery. Social History:   reports that she quit smoking about 14 years ago. Her smoking use included cigarettes. She started smoking about 44 years ago. She has a 5.00 pack-year smoking history. She has never used smokeless tobacco. She reports previous alcohol use.  She reports that she does not use drugs. Family History:  family history includes Breast Cancer in her maternal aunt; Heart Disease in her father and maternal grandmother; High Blood Pressure in her father. Home Medications:  Prior to Admission medications    Medication Sig Start Date End Date Taking? Authorizing Provider   Prenatal Vit-Fe Fumarate-FA (PRENATAL COMPLETE PO) Take by mouth   Yes Historical Provider, MD   OnabotulinumtoxinA (BOTOX IJ) Inject as directed   Yes Historical Provider, MD   ferrous sulfate (IRON 325) 325 (65 Fe) MG tablet Take 325 mg by mouth daily (with breakfast)   Yes Historical Provider, MD   aspirin EC 81 MG EC tablet Take 1 tablet by mouth daily 3/19/21  Yes Jese Haddox, DO   Ketorolac Tromethamine (TORADOL IJ) Inject as directed as needed   Yes Historical Provider, MD   morphine (MS CONTIN) 30 MG extended release tablet Take 30 mg by mouth nightly.  11/30/20  Yes Historical Provider, MD   Biotin 1000 MCG TABS Take 1,000 mcg by mouth nightly   Yes Historical Provider, MD   Vitamin D (CHOLECALCIFEROL) 25 MCG (1000 UT) TABS tablet Take 1,000 Units by mouth daily   Yes Historical Provider, MD   ketorolac (TORADOL) 10 MG tablet as needed 11/10/20  Yes Historical Provider, MD   levalbuterol (XOPENEX) 1.25 MG/3ML nebulizer solution Take 3 mLs by nebulization every 8 hours as needed for Wheezing or Shortness of Breath 6/10/20  Yes Gumaro Contreras MD   ipratropium (ATROVENT) 0.02 % nebulizer solution Take 2.5 mLs by nebulization 4 times daily 4/2/19  Yes Gumaro Contreras MD   tiotropium (SPIRIVA) 18 MCG inhalation capsule Inhale 18 mcg into the lungs daily    Yes Historical Provider, MD   tiZANidine (ZANAFLEX) 4 MG tablet Take 4 mg by mouth nightly  3/14/18  Yes Historical Provider, MD   omeprazole (PRILOSEC) 20 MG delayed release capsule Take 20 mg by mouth 2 times daily  5/30/17  Yes Historical Provider, MD   vitamin B-2 (RIBOFLAVIN) 100 MG TABS tablet  3/20/17  Yes Historical Provider, MD   cetirizine (ZYRTEC) 10 MG tablet Take 10 mg by mouth daily   Yes Historical Provider, MD   naloxegol (MOVANTIK) 25 MG TABS tablet Take 25 mg by mouth every morning   Yes Historical Provider, MD   Multiple Vitamins-Minerals (THERAPEUTIC MULTIVITAMIN-MINERALS) tablet Take 1 tablet by mouth daily   Yes Historical Provider, MD   Ascorbic Acid (VITAMIN C) 250 MG tablet Take 1,000 mg by mouth daily   Yes Historical Provider, MD   estrogens, conjugated, (PREMARIN) 1.25 MG tablet Take by mouth   Yes Historical Provider, MD   morphine (AVINZA) 60 MG extended release capsule Take 60 mg by mouth daily. Yes Historical Provider, MD   SUMAtriptan (IMITREX) 100 MG tablet Take 200 mg by mouth once as needed. Yes Historical Provider, MD   SUMAtriptan (IMITREX) 5 MG/ACT nasal spray 1 spray by Nasal route daily as needed. Yes Historical Provider, MD   QUEtiapine (SEROQUEL) 50 MG tablet Take 50 mg by mouth nightly    Yes Historical Provider, MD   topiramate (TOPAMAX) 200 MG tablet Take 200 mg by mouth daily    Yes Historical Provider, MD   rosuvastatin (CRESTOR) 10 MG tablet Take 1 tablet by mouth daily  Patient not taking: Reported on 5/3/2021 4/9/21   Jeremías Will MD   montelukast (SINGULAIR) 10 MG tablet Take 1 tablet by mouth nightly  Patient not taking: Reported on 10/15/2021 11/15/20   MD Zeeshan Bernabe (AIMOVIG SC) Inject into the skin every 30 days  Patient not taking: Reported on 10/15/2021    Historical Provider, MD        Allergies:  Latex, Acyclovir, Albuterol, Astemizole, Food color pink, Iv dye [iodides], Other, Penicillins, Sulfa antibiotics, Tobramycin-dexamethasone, and Codeine     Review of Systems:   · Constitutional: there has been no unanticipated weight loss. There's been no change in energy level, sleep pattern, or activity level. · Eyes: No visual changes or diplopia. No scleral icterus. · ENT: No Headaches, hearing loss or vertigo. No mouth sores or sore throat.   · Cardiovascular: Reviewed in HPI  · Respiratory: No cough or wheezing, no sputum production. No hematemesis. · Gastrointestinal: No abdominal pain, appetite loss, blood in stools. No change in bowel or bladder habits. · Genitourinary: No dysuria, trouble voiding, or hematuria. · Musculoskeletal:  No gait disturbance, weakness or joint complaints. · Integumentary: No rash or pruritis. · Neurological: No headache, diplopia, change in muscle strength, numbness or tingling. No change in gait, balance, coordination, mood, affect, memory, mentation, behavior. · Psychiatric: No anxiety, no depression. · Endocrine: No malaise, fatigue or temperature intolerance. No excessive thirst, fluid intake, or urination. No tremor. · Hematologic/Lymphatic: No abnormal bruising or bleeding, blood clots or swollen lymph nodes. · Allergic/Immunologic: No nasal congestion or hives. Physical Examination:    Vitals:    10/15/21 1142   BP: 114/82   Pulse: 89   SpO2: 97%        Constitutional and General Appearance: NAD   Respiratory:  · Normal excursion and expansion without use of accessory muscles  · Resp Auscultation: Normal breath sounds without dullness  Cardiovascular:  · The apical impulses not displaced  · Heart tones are crisp and normal  · Cervical veins are not engorged  · The carotid upstroke is normal in amplitude and contour without delay or bruit  · Normal S1S2, No S3, No Murmur  · Peripheral pulses are symmetrical and full  · There is no clubbing, cyanosis of the extremities. · No edema  · Femoral Arteries: 2+ and equal  · Pedal Pulses: 2+ and equal   Abdomen:  · No masses or tenderness  · Liver/Spleen: No Abnormalities Noted  Neurological/Psychiatric:  · Alert and oriented in all spheres  · Moves all extremities well  · Exhibits normal gait balance and coordination  · No abnormalities of mood, affect, memory, mentation, or behavior are noted    ECHO 1/29/2021  Summary  LV systolic function is normal with EF estimated at 55%.   No regional wall motion abnormalities. Normal left ventricular diastolic filling pressure. Mild mitral and tricuspid regurgitation. Inadequate tricuspid regurgitation jet to estimate systolic artery pressure  (SPAP). Stress test 1/29/2021  Summary  Abnormal stress EKG suggestive of exercise induced ischemia. Left heart cath Dr. Shante Patel 3/19/2021  Impression:  1. Mild non-obstructive coronary artery disease. 2. Normal LVEDP. Assessment:   SOB due to asthma. Chronic, worse today but overall unchanged   Asthma   Abnormal GXT stress test -  False poaitive  CAD - stable. nonobstructive  Chest pain - atypical. Appears not cardiac. No recent   RBBB  Leg cramps on Lipitor - resolved post stopped   Preoperative cardiac risk assessment for shoulder surgery - low risk  Nausea on Crestor     Plan:  Plan to repeat fasting lipids in 6 months   Cardiac medications reviewed including indications and pertinent side effects    Stop crestor. Discussed zetia - she declines   Check blood pressure and heart rate at home a few times per week- keep a log with dates and times and bring to office visit   Regular exercise and following a healthy diet encouraged   Follow up with me in 1 year     Scribe's attestation: This note was scribed in the presence of Dr. Tomás Park M.D. By Nelson Patino RN    The scribes documentation has been prepared under my direction and personally reviewed by me in its entirety. I confirm that the note above accurately reflects all work, treatment, procedures, and medical decision making performed by me. Dr. Tomás Park MD      Thank you for allowing me to participate in the care of this individual.      Lissette Alfaro.  Tristin Jorge M.D., Laura Davis

## 2021-10-15 NOTE — PATIENT INSTRUCTIONS
Plan:  Plan to repeat fasting lipids in 6 months   Cardiac medications reviewed including indications and pertinent side effects    Check blood pressure and heart rate at home a few times per week- keep a log with dates and times and bring to office visit   Regular exercise and following a healthy diet encouraged   Follow up with me in 1 year

## 2021-10-15 NOTE — LETTER
Temitope Ford  1965  [address]   Cardiac Consultation    Referring Provider:  Mackenzie Aldana     Chief Complaint   Patient presents with    Follow-up    Shortness of Breath     asthma      Temitope Ford   1965      History of Present Illness:    Temitope Ford is a 61 y.o. who is here today for follow up for chest pain and SOB. Her echo from 1/29/2021 showed an EF of 55%. She had an abnormal stress test and then a left heart cath 3/19/2021 which showed mild non-obstructive coronary artery disease. (via a right femoral artery approach). She presented to the office on 3/29/2021 with tenderness at her right femoral site. There are resolving ecchymoses present along the right groin and right thigh. Right femoral artery pulse and distal RLE pulses are 2+. No bruit is appreciated. Today she states she has been feeling well overall but is having some issues with her asthma today and has been having SOB. She plans to go home and use her rescue inhaler. No fevers. She states she stopped her Statin therapy, Crestor, due to nausea which resolved off the medication. She continues to taking aspirin 81 mg daily and seems to be tolerating this well. She has also been attempting to eat a healthier diet. She has lost 11 lbs and is working out on a regular basis. She has not been eating red meat. Patient currently denies any weight gain, edema, palpitations, chest pain, dizziness, and syncope. Past Medical History:   has a past medical history of Asthma, Back pain, Headache, and Neck pain. Surgical History:   has a past surgical history that includes Neck surgery; Hysterectomy; Neck surgery; hip surgery; and Foot surgery. Social History:   reports that she quit smoking about 14 years ago. Her smoking use included cigarettes. She started smoking about 44 years ago. She has a 5.00 pack-year smoking history. She has never used smokeless tobacco. She reports previous alcohol use. She reports that she does not use drugs. Family History:  family history includes Breast Cancer in her maternal aunt; Heart Disease in her father and maternal grandmother; High Blood Pressure in her father. Home Medications:  Prior to Admission medications    Medication Sig Start Date End Date Taking? Authorizing Provider   Prenatal Vit-Fe Fumarate-FA (PRENATAL COMPLETE PO) Take by mouth   Yes Historical Provider, MD   OnabotulinumtoxinA (BOTOX IJ) Inject as directed   Yes Historical Provider, MD   ferrous sulfate (IRON 325) 325 (65 Fe) MG tablet Take 325 mg by mouth daily (with breakfast)   Yes Historical Provider, MD   aspirin EC 81 MG EC tablet Take 1 tablet by mouth daily 3/19/21  Yes Jese Vaughn,    Ketorolac Tromethamine (TORADOL IJ) Inject as directed as needed   Yes Historical Provider, MD   morphine (MS CONTIN) 30 MG extended release tablet Take 30 mg by mouth nightly.  11/30/20  Yes Historical Provider, MD   Biotin 1000 MCG TABS Take 1,000 mcg by mouth nightly   Yes Historical Provider, MD   Vitamin D (CHOLECALCIFEROL) 25 MCG (1000 UT) TABS tablet Take 1,000 Units by mouth daily   Yes Historical Provider, MD   ketorolac (TORADOL) 10 MG tablet as needed 11/10/20  Yes Historical Provider, MD   levalbuterol (XOPENEX) 1.25 MG/3ML nebulizer solution Take 3 mLs by nebulization every 8 hours as needed for Wheezing or Shortness of Breath 6/10/20  Yes Juanita Roldan MD   ipratropium (ATROVENT) 0.02 % nebulizer solution Take 2.5 mLs by nebulization 4 times daily 4/2/19  Yes Juanita Roldan MD   tiotropium (SPIRIVA) 18 MCG inhalation capsule Inhale 18 mcg into the lungs daily    Yes Historical Provider, MD   tiZANidine (ZANAFLEX) 4 MG tablet Take 4 mg by mouth nightly  3/14/18  Yes Historical Provider, MD   omeprazole (PRILOSEC) 20 MG delayed release capsule Take 20 mg by mouth 2 times daily  5/30/17  Yes Historical Provider, MD   vitamin B-2 (RIBOFLAVIN) 100 MG TABS tablet  3/20/17  Yes Historical Provider, MD   cetirizine (ZYRTEC) 10 MG tablet Take 10 mg by mouth daily   Yes Historical Provider, MD   naloxegol (MOVANTIK) 25 MG TABS tablet Take 25 mg by mouth every morning   Yes Historical Provider, MD   Multiple Vitamins-Minerals (THERAPEUTIC MULTIVITAMIN-MINERALS) tablet Take 1 tablet by mouth daily   Yes Historical Provider, MD   Ascorbic Acid (VITAMIN C) 250 MG tablet Take 1,000 mg by mouth daily   Yes Historical Provider, MD   estrogens, conjugated, (PREMARIN) 1.25 MG tablet Take by mouth   Yes Historical Provider, MD   morphine (AVINZA) 60 MG extended release capsule Take 60 mg by mouth daily. Yes Historical Provider, MD   SUMAtriptan (IMITREX) 100 MG tablet Take 200 mg by mouth once as needed. Yes Historical Provider, MD   SUMAtriptan (IMITREX) 5 MG/ACT nasal spray 1 spray by Nasal route daily as needed. Yes Historical Provider, MD   QUEtiapine (SEROQUEL) 50 MG tablet Take 50 mg by mouth nightly    Yes Historical Provider, MD   topiramate (TOPAMAX) 200 MG tablet Take 200 mg by mouth daily    Yes Historical Provider, MD   rosuvastatin (CRESTOR) 10 MG tablet Take 1 tablet by mouth daily  Patient not taking: Reported on 5/3/2021 4/9/21   Judson Canavan, MD   montelukast (SINGULAIR) 10 MG tablet Take 1 tablet by mouth nightly  Patient not taking: Reported on 10/15/2021 11/15/20   MD Radha Charlton (AIMOVIG SC) Inject into the skin every 30 days  Patient not taking: Reported on 10/15/2021    Historical Provider, MD        Allergies:  Latex, Acyclovir, Albuterol, Astemizole, Food color pink, Iv dye [iodides], Other, Penicillins, Sulfa antibiotics, Tobramycin-dexamethasone, and Codeine     Review of Systems:   · Constitutional: there has been no unanticipated weight loss. There's been no change in energy level, sleep pattern, or activity level. · Eyes: No visual changes or diplopia. No scleral icterus. · ENT: No Headaches, hearing loss or vertigo.  No mouth sores or sore throat. · Cardiovascular: Reviewed in HPI  · Respiratory: No cough or wheezing, no sputum production. No hematemesis. · Gastrointestinal: No abdominal pain, appetite loss, blood in stools. No change in bowel or bladder habits. · Genitourinary: No dysuria, trouble voiding, or hematuria. · Musculoskeletal:  No gait disturbance, weakness or joint complaints. · Integumentary: No rash or pruritis. · Neurological: No headache, diplopia, change in muscle strength, numbness or tingling. No change in gait, balance, coordination, mood, affect, memory, mentation, behavior. · Psychiatric: No anxiety, no depression. · Endocrine: No malaise, fatigue or temperature intolerance. No excessive thirst, fluid intake, or urination. No tremor. · Hematologic/Lymphatic: No abnormal bruising or bleeding, blood clots or swollen lymph nodes. · Allergic/Immunologic: No nasal congestion or hives. Physical Examination:    Vitals:    10/15/21 1142   BP: 114/82   Pulse: 89   SpO2: 97%        Constitutional and General Appearance: NAD   Respiratory:  · Normal excursion and expansion without use of accessory muscles  · Resp Auscultation: Normal breath sounds without dullness  Cardiovascular:  · The apical impulses not displaced  · Heart tones are crisp and normal  · Cervical veins are not engorged  · The carotid upstroke is normal in amplitude and contour without delay or bruit  · Normal S1S2, No S3, No Murmur  · Peripheral pulses are symmetrical and full  · There is no clubbing, cyanosis of the extremities.   · No edema  · Femoral Arteries: 2+ and equal  · Pedal Pulses: 2+ and equal   Abdomen:  · No masses or tenderness  · Liver/Spleen: No Abnormalities Noted  Neurological/Psychiatric:  · Alert and oriented in all spheres  · Moves all extremities well  · Exhibits normal gait balance and coordination  · No abnormalities of mood, affect, memory, mentation, or behavior are noted    ECHO 1/29/2021  Summary  LV systolic function is normal with EF estimated at 55%. No regional wall motion abnormalities. Normal left ventricular diastolic filling pressure. Mild mitral and tricuspid regurgitation. Inadequate tricuspid regurgitation jet to estimate systolic artery pressure  (SPAP). Stress test 1/29/2021  Summary  Abnormal stress EKG suggestive of exercise induced ischemia. Left heart cath Dr. Heike Hugo 3/19/2021  Impression:  1. Mild non-obstructive coronary artery disease. 2. Normal LVEDP. Assessment:   SOB due to asthma. Chronic, worse today but overall unchanged   Asthma   Abnormal GXT stress test -  False poaitive  CAD - stable. nonobstructive  Chest pain - atypical. Appears not cardiac. No recent   RBBB  Leg cramps on Lipitor - resolved post stopped   Preoperative cardiac risk assessment for shoulder surgery - low risk  Nausea on Crestor     Plan:  Plan to repeat fasting lipids in 6 months   Cardiac medications reviewed including indications and pertinent side effects    Stop crestor. Discussed zetia - she declines   Check blood pressure and heart rate at home a few times per week- keep a log with dates and times and bring to office visit   Regular exercise and following a healthy diet encouraged   Follow up with me in 1 year     Scribe's attestation: This note was scribed in the presence of Dr. Jacky Alfonso M.D. By Esme Pemberton RN    The scribes documentation has been prepared under my direction and personally reviewed by me in its entirety. I confirm that the note above accurately reflects all work, treatment, procedures, and medical decision making performed by me. Dr. Jacky Alfonso MD      Thank you for allowing me to participate in the care of this individual.      Brendan Tsai.  Guillaume Busby M.D., Josue Story

## 2021-12-03 RX ORDER — MONTELUKAST SODIUM 10 MG/1
TABLET ORAL
Qty: 30 TABLET | Refills: 0 | Status: SHIPPED | OUTPATIENT
Start: 2021-12-03 | End: 2022-02-07 | Stop reason: SDUPTHER

## 2022-01-17 ENCOUNTER — TELEPHONE (OUTPATIENT)
Dept: PULMONOLOGY | Age: 57
End: 2022-01-17

## 2022-01-17 DIAGNOSIS — R06.02 SOB (SHORTNESS OF BREATH): Primary | ICD-10-CM

## 2022-01-17 DIAGNOSIS — J45.50 ASTHMA, SEVERE PERSISTENT, WELL-CONTROLLED: ICD-10-CM

## 2022-01-17 DIAGNOSIS — J98.4 RESTRICTIVE LUNG DISEASE: ICD-10-CM

## 2022-01-17 DIAGNOSIS — J41.0 SIMPLE CHRONIC BRONCHITIS (HCC): ICD-10-CM

## 2022-01-17 RX ORDER — LEVALBUTEROL INHALATION SOLUTION 1.25 MG/3ML
1.25 SOLUTION RESPIRATORY (INHALATION) EVERY 8 HOURS PRN
Qty: 720 ML | Refills: 5 | Status: SHIPPED | OUTPATIENT
Start: 2022-01-17

## 2022-01-17 RX ORDER — METHYLPREDNISOLONE 4 MG/1
TABLET ORAL
Qty: 1 KIT | Refills: 0 | Status: SHIPPED | OUTPATIENT
Start: 2022-01-17 | End: 2022-01-23

## 2022-01-17 NOTE — TELEPHONE ENCOUNTER
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 Feels like a \"elephant is sitting on her chest.\" She had a couple of prednisone left that she took and it helped some wants to know if you will call in prednisone. Also her nebulizer med is . I will kimberlee It. To you if want you can sign.

## 2022-02-07 ENCOUNTER — TELEPHONE (OUTPATIENT)
Dept: PULMONOLOGY | Age: 57
End: 2022-02-07

## 2022-02-07 DIAGNOSIS — J45.50 SEVERE PERSISTENT ASTHMA WITHOUT COMPLICATION: Primary | ICD-10-CM

## 2022-02-07 DIAGNOSIS — J41.0 SIMPLE CHRONIC BRONCHITIS (HCC): ICD-10-CM

## 2022-02-07 RX ORDER — MONTELUKAST SODIUM 10 MG/1
TABLET ORAL
Qty: 30 TABLET | Refills: 5 | Status: SHIPPED | OUTPATIENT
Start: 2022-02-07 | End: 2022-08-17 | Stop reason: SDUPTHER

## 2022-02-07 RX ORDER — LEVALBUTEROL TARTRATE 45 UG/1
2 AEROSOL, METERED ORAL EVERY 6 HOURS PRN
Qty: 1 EACH | Refills: 3 | Status: SHIPPED | OUTPATIENT
Start: 2022-02-07 | End: 2022-10-13 | Stop reason: SDUPTHER

## 2022-02-07 NOTE — TELEPHONE ENCOUNTER
Patient calling for a refill for Levalbuterol (xopenex hfa )pt was advise to contact her PCP,pt stated her PCP told her they can't refill prescription that has to be refill by her Pulmonology.   Pt next appt is on 4/12/22 w/ Dr Seema Junior    Please advise

## 2022-02-08 ENCOUNTER — TELEPHONE (OUTPATIENT)
Dept: PULMONOLOGY | Age: 57
End: 2022-02-08

## 2022-02-14 ENCOUNTER — TELEPHONE (OUTPATIENT)
Dept: PULMONOLOGY | Age: 57
End: 2022-02-14

## 2022-02-22 NOTE — TELEPHONE ENCOUNTER
Your PA request has been closed. There is an active Prior Authorization approval on file until 02/08/2023. Based on this patient's pharmacy history, there is a paid claim for this medication on 02/08/22. PLEASE NOTE: If this medication is classified as specialty, it should be filled through a specialty pharmacy. This request is now closed.

## 2022-03-03 RX ORDER — ASPIRIN 81 MG/1
TABLET, COATED ORAL
Qty: 30 TABLET | Refills: 1 | Status: SHIPPED | OUTPATIENT
Start: 2022-03-03 | End: 2022-05-18 | Stop reason: SDUPTHER

## 2022-03-03 NOTE — TELEPHONE ENCOUNTER
Last OV with [facility] 10/15/2021  Last CMP 3/19/2021   Last CBC 3/19/2021    [facility] would you like pt. To have their annual CMP and CBC done?

## 2022-04-11 ASSESSMENT — ENCOUNTER SYMPTOMS
CHEST TIGHTNESS: 1
SINUS PRESSURE: 1
SHORTNESS OF BREATH: 1
EYE ITCHING: 1
VOICE CHANGE: 1
WHEEZING: 1

## 2022-04-12 ENCOUNTER — OFFICE VISIT (OUTPATIENT)
Dept: PULMONOLOGY | Age: 57
End: 2022-04-12
Payer: MEDICAID

## 2022-04-12 VITALS
SYSTOLIC BLOOD PRESSURE: 114 MMHG | OXYGEN SATURATION: 97 % | BODY MASS INDEX: 25.07 KG/M2 | RESPIRATION RATE: 16 BRPM | HEIGHT: 66 IN | WEIGHT: 156 LBS | HEART RATE: 68 BPM | TEMPERATURE: 97.6 F | DIASTOLIC BLOOD PRESSURE: 69 MMHG

## 2022-04-12 DIAGNOSIS — J43.2 CENTRILOBULAR EMPHYSEMA (HCC): ICD-10-CM

## 2022-04-12 DIAGNOSIS — J45.40 MODERATE PERSISTENT ASTHMA WITHOUT COMPLICATION: Primary | ICD-10-CM

## 2022-04-12 DIAGNOSIS — J43.9 BULLA OF LUNG (HCC): ICD-10-CM

## 2022-04-12 DIAGNOSIS — J30.1 SEASONAL ALLERGIC RHINITIS DUE TO POLLEN: ICD-10-CM

## 2022-04-12 PROCEDURE — G8427 DOCREV CUR MEDS BY ELIG CLIN: HCPCS | Performed by: INTERNAL MEDICINE

## 2022-04-12 PROCEDURE — 3023F SPIROM DOC REV: CPT | Performed by: INTERNAL MEDICINE

## 2022-04-12 PROCEDURE — 1036F TOBACCO NON-USER: CPT | Performed by: INTERNAL MEDICINE

## 2022-04-12 PROCEDURE — 3017F COLORECTAL CA SCREEN DOC REV: CPT | Performed by: INTERNAL MEDICINE

## 2022-04-12 PROCEDURE — 99213 OFFICE O/P EST LOW 20 MIN: CPT | Performed by: INTERNAL MEDICINE

## 2022-04-12 PROCEDURE — G8419 CALC BMI OUT NRM PARAM NOF/U: HCPCS | Performed by: INTERNAL MEDICINE

## 2022-04-12 ASSESSMENT — PATIENT HEALTH QUESTIONNAIRE - PHQ9
SUM OF ALL RESPONSES TO PHQ QUESTIONS 1-9: 0
1. LITTLE INTEREST OR PLEASURE IN DOING THINGS: 0
SUM OF ALL RESPONSES TO PHQ9 QUESTIONS 1 & 2: 0
2. FEELING DOWN, DEPRESSED OR HOPELESS: 0

## 2022-04-12 ASSESSMENT — ENCOUNTER SYMPTOMS
COUGH: 0
RHINORRHEA: 0
BACK PAIN: 0

## 2022-04-12 NOTE — LETTER
To whom it may concern;        Please excuse Archie Kinney. Steve Jo from Work today 4/12/22  ,Saritha Jacques was at a her Doctor office visit.           Sincerely       Eugenie Henderson MD

## 2022-04-12 NOTE — PROGRESS NOTES
Pulmonary Outpatient Note   Sherri Boone MD       4/12/2022    1. Moderate persistent asthma without complication    2. Bulla of lung ([address]    3. Centrilobular emphysema ([address]    4. Seasonal allergic rhinitis due to pollen          ASSESSMENT/PLAN:  Moderate persistent asthma. Continue with bronchodilators  Bulla left lung, emphysema, possible COPD. Confirmed on CT chest.  She also has minimal upper lobe emphysema, mostly paraseptal, minimal centrilobular. PFT results discussed with her. Alpha-1 antitrypsin level was normal.   Seasonal allergic rhinitis. Recommend nasal steroid during the season. On Singulair and Zyrtec. Former smoker. No lung nodules on last CT. Prophylaxis. Has received Prevnar, Pneumovax and flu shot. She contracted COVID-19 infection in February    RTC 6 months, call earlier if symptomatic. Chief Complaint:  Follow-up (6 mo) and Asthma       HPI: Eamon Hale is a 61y.o. year old female with moderate persistent asthma, left lower lobe bulla, seasonal allergic rhinitis. And multiple other medical issues. She is a prior smoker. May Tobar seen by me by virtual visit in 12/20, then by Dr. Francois Javier on 5/3/2021 for preoperative clearance for shoulder surgery. She tolerated the surgery well. She has CAD, undergoing medical management. She has 5-pack-year history of smoking, has not smoked since 2007. The patient drove a bus in the past, now does food delivery. The patient uses levalbuterol MDI occasionally, never albuterol nebulizer during the spring season about once a week if the need arises. Presently she has increased allergic symptoms involving her eye, she has post nasal drip. Symptoms are worse when the pollen counts are higher. She does have some wheezing. Occasionally in the morning and at night she has a sensation of chest tightness as though someone is sitting on her chest.  Overall, her shortness of breath is not significantly worse.   She could not complete allergy testing due to the fact she was on quetiapine. The patient did have COVID-19 infection in February this year, symptoms are possibly worse since then. She did live in a house that had some mold about 5 years ago. At her last visit with me, she was recommended alpha-1 antitrypsin testing due to her bulla. PFT 12/21/2020  FVC 3.65 L, 96% predicted, FEV1 2.86 L, 97% predicted, with ratio of 78%. There was no response to bronchodilator. Lung volumes and diffusion capacity were normal.    Alpha-1 antitrypsin was normal    Previous note by me reviewed and edited as necessary. Mark Del Rio was seen by virtual visit on 6/25/2020. Since her last visit, the patient says she has a consistent pattern of increased dyspnea during the summer and spring. She cannot do outdoor when the pollen count is high. She notes constriction in her chest, shortness of breath, eye symptoms and nasal congestion. She also notes chest pain. She feels better when she wears a mask. She uses levalbuterol MDI, more in the spring and summer hours. When symptoms are worse, she needs a nebulizer. 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 She has increased symptoms when she is exposed to cleaning solutions and sprays. She is mildly short of breath climbing up stairs, but not the fewer stairs that she has in her home. He has gained about 15 pounds since the COVID-19 situation. She does snore at night. He also has issues with insomnia. She has a good appetite, no GI or  complaints. The rest of her ROS was negative. Her medications and allergies were reviewed. Previous notes reviewed and edited as necessary. Mark Del Rio was recently seen by Dr. Haily Villa on 6/2, since she had failed usual treatment for asthma, she had been referred to an allergist and allergen profile was ordered. Prednisone taper was given to be started after lab work was completed.   Xopenex was refilled her voice, and this is slightly improved, she missed one day of work last week. Her boyfriend got sick after her, there was no recent travel or other sick contacts. Her boyfriend does not smoke. She also had increased dyspnea, and was using her rescue inhaler very frequently, up to 3 times a day. She has a spacer that she uses on her albuterol, but not on Dulera. She was compliant with the [facility]. She denied GI or  complaints. The patient gives a history of smoking up to 1.5 pack per day, but for less than 10 years intermittently. She has not smoked for more than 15 years. I reviewed her CT chest and her PFT. Her last PFT on 6/27/17 showed FEV1 of 1.34 L, 44% predicted, with a good bronchodilator response. TLC was reduced at 77% predicted, there was evidence of air trapping. There was a significant decline in her TLC from 6/29/10.     Past Medical History:   Diagnosis Date    Asthma     Back pain     COVID-19     Headache     Neck pain        Past Surgical History:   Procedure Laterality Date    FOOT SURGERY      right    HIP SURGERY      HYSTERECTOMY      NECK SURGERY      NECK SURGERY         Social History     Tobacco Use    Smoking status: Former Smoker     Packs/day: 0.50     Years: 10.00     Pack years: 5.00     Types: Cigarettes     Start date: 7/11/1977     Quit date: 1/1/2007     Years since quitting: 15.2    Smokeless tobacco: Never Used   Substance Use Topics    Alcohol use: Not Currently       Family History   Problem Relation Age of Onset    High Blood Pressure Father     Heart Disease Father         aorta ruptured    Heart Disease Maternal Grandmother         pacemaker    Breast Cancer Maternal Aunt          Current Outpatient Medications:     ASPIRIN LOW DOSE 81 MG EC tablet, TAKE ONE TABLET BY MOUTH DAILY, Disp: 30 tablet, Rfl: 1    montelukast (SINGULAIR) 10 MG tablet, TAKE ONE TABLET BY MOUTH ONCE NIGHTLY, Disp: 30 tablet, Rfl: 5    levalbuterol (XOPENEX HFA) 45 MCG/ACT inhaler, Inhale 2 puffs into the lungs every 6 hours as needed for Wheezing or Shortness of Breath, Disp: 1 each, Rfl: 3    levalbuterol (XOPENEX) 1.25 MG/3ML nebulizer solution, Take 3 mLs by nebulization every 8 hours as needed for Wheezing or Shortness of Breath, Disp: 720 mL, Rfl: 5    Prenatal Vit-Fe Fumarate-FA (PRENATAL COMPLETE PO), Take by mouth, Disp: , Rfl:     OnabotulinumtoxinA (BOTOX IJ), Inject as directed, Disp: , Rfl:     ferrous sulfate (IRON 325) 325 (65 Fe) MG tablet, Take 325 mg by mouth daily (with breakfast), Disp: , Rfl:     Ketorolac Tromethamine (TORADOL IJ), Inject as directed as needed, Disp: , Rfl:     morphine (MS CONTIN) 30 MG extended release tablet, Take 30 mg by mouth nightly., Disp: , Rfl:     Biotin 1000 MCG TABS, Take 1,000 mcg by mouth nightly, Disp: , Rfl:     Vitamin D (CHOLECALCIFEROL) 25 MCG (1000 UT) TABS tablet, Take 1,000 Units by mouth daily, Disp: , Rfl:     ketorolac (TORADOL) 10 MG tablet, as needed, Disp: , Rfl:     tiotropium (SPIRIVA) 18 MCG inhalation capsule, Inhale 18 mcg into the lungs daily , Disp: , Rfl:     tiZANidine (ZANAFLEX) 4 MG tablet, Take 4 mg by mouth nightly , Disp: , Rfl:     omeprazole (PRILOSEC) 20 MG delayed release capsule, Take 20 mg by mouth 2 times daily , Disp: , Rfl: 5    cetirizine (ZYRTEC) 10 MG tablet, Take 10 mg by mouth daily, Disp: , Rfl:     naloxegol (MOVANTIK) 25 MG TABS tablet, Take 25 mg by mouth every morning, Disp: , Rfl:     Multiple Vitamins-Minerals (THERAPEUTIC MULTIVITAMIN-MINERALS) tablet, Take 1 tablet by mouth daily, Disp: , Rfl:     Ascorbic Acid (VITAMIN C) 250 MG tablet, Take 1,000 mg by mouth daily, Disp: , Rfl:     estrogens, conjugated, (PREMARIN) 1.25 MG tablet, Take by mouth, Disp: , Rfl:     morphine (AVINZA) 60 MG extended release capsule, Take 60 mg by mouth daily. , Disp: , Rfl:     SUMAtriptan (IMITREX) 100 MG tablet, Take 200 mg by mouth once as needed. , Disp: , Rfl:    SUMAtriptan (IMITREX) 5 MG/ACT nasal spray, 1 spray by Nasal route daily as needed 20 mg nasal spray, Disp: , Rfl:     QUEtiapine (SEROQUEL) 50 MG tablet, Take 50 mg by mouth nightly , Disp: , Rfl:     topiramate (TOPAMAX) 200 MG tablet, Take 200 mg by mouth daily , Disp: , Rfl:     Erenumab-aooe (AIMOVIG SC), Inject into the skin every 30 days (Patient not taking: Reported on 10/15/2021), Disp: , Rfl:     Latex, Acyclovir, Albuterol, Astemizole, Food color pink, Iv dye [iodides], Other, Penicillins, Statins, Sulfa antibiotics, Tobramycin-dexamethasone, and Codeine    Vitals:    04/12/22 1049   BP: 114/69   Site: Left Upper Arm   Position: Sitting   Cuff Size: Medium Adult   Pulse: 68   Resp: 16   Temp: 97.6 °F (36.4 °C)   TempSrc: Temporal   SpO2: 97%   Weight: 156 lb (70.8 kg)   Height: 5' 6\" (1.676 m)       Review of Systems   HENT: Positive for congestion, postnasal drip, sinus pressure and voice change. Negative for rhinorrhea. Eyes: Positive for itching. Respiratory: Positive for chest tightness, shortness of breath and wheezing. Negative for cough. Chest wall pain   Musculoskeletal: Negative for back pain. Psychiatric/Behavioral: Negative for sleep disturbance. All other systems reviewed and are negative. Physical exam:  Constitutional: She is oriented to person, place, and time. She appears well-developed and well-nourished. No distress. Thin built, mildly gravelly voice   HENT:   Head: Normocephalic and atraumatic. Nose: Nose normal.   Mouth/Throat: No oropharyngeal exudate. Class II airway  Eyes: Conjunctivae are normal. Pupils are equal, round, and reactive to light. Right eye exhibits no discharge. Left eye exhibits  no discharge. No scleral icterus. Neck: No JVD present. No tracheal deviation present. Cardiovascular: Normal rate, regular rhythm and normal heart sounds. Exam reveals no gallop. No murmur heard. Pulmonary/Chest: Effort normal. No stridor.  No respiratory distress. She has wheezes (Few scattered). She has no rales. Neurological: She is alert and oriented to person, place, and time. Skin: Skin is warm and dry. No rash noted. She is not diaphoretic. No erythema. No pallor. Psychiatric: She has a normal mood and affect.  Her behavior is normal.   Vitals reviewed.

## 2022-04-12 NOTE — PATIENT INSTRUCTIONS
Remember to bring a list of pulmonary medications and any CPAP or BiPAP machines to your next appointment with the office. Please keep all of your future appointments scheduled by [address], [facility] office. Out of respect for other patients and providers, you may be asked to reschedule your appointment if you arrive later than your scheduled appointment time. Appointments cancelled less than 24hrs in advance will be considered a no show. Patients with three missed appointments within 1 year or four missed appointments within 2 years can be dismissed from the practice. Please be aware that our physicians are required to work in the Intensive Care Unit at [facility].  Your appointment may need to be rescheduled if they are designated to work during your appointment time. You may receive a survey regarding the care you received during your visit. Your input is valuable to us. We encourage you to complete and return your survey. We hope you will choose us in the future for your healthcare needs. Pt instructed of all future appointment dates & times, including radiology, labs, procedures & referrals. If procedures were scheduled preparation instructions provided. Instructions on future appointments with [facility] Pul[facility] were given.

## 2022-04-12 NOTE — PROGRESS NOTES
MA Communication:   The following orders are received by verbal communication from Meghan Woods MD    Orders include:    6 month follow up

## 2022-05-18 RX ORDER — ASPIRIN 81 MG/1
TABLET ORAL
Qty: 90 TABLET | Refills: 3 | Status: SHIPPED | OUTPATIENT
Start: 2022-05-18

## 2022-06-20 RX ORDER — CETIRIZINE HYDROCHLORIDE 10 MG/1
10 TABLET ORAL DAILY
Qty: 30 TABLET | Refills: 3 | Status: SHIPPED | OUTPATIENT
Start: 2022-06-20 | End: 2022-10-13 | Stop reason: SDUPTHER

## 2022-08-17 RX ORDER — MONTELUKAST SODIUM 10 MG/1
TABLET ORAL
Qty: 30 TABLET | Refills: 2 | Status: SHIPPED | OUTPATIENT
Start: 2022-08-17 | End: 2022-10-13 | Stop reason: SDUPTHER

## 2022-09-20 ENCOUNTER — HOSPITAL ENCOUNTER (OUTPATIENT)
Dept: GENERAL RADIOLOGY | Age: 57
Discharge: HOME | End: 2022-09-20
Payer: MEDICAID

## 2022-09-20 ENCOUNTER — HOSPITAL ENCOUNTER (OUTPATIENT)
Age: 57
Discharge: HOME | End: 2022-09-20
Payer: MEDICAID

## 2022-09-20 DIAGNOSIS — M25.561 RIGHT KNEE PAIN, UNSPECIFIED CHRONICITY: ICD-10-CM

## 2022-09-20 DIAGNOSIS — M47.817 LUMBOSACRAL SPONDYLOSIS WITHOUT MYELOPATHY: ICD-10-CM

## 2022-09-20 PROCEDURE — 73560 X-RAY EXAM OF KNEE 1 OR 2: CPT

## 2022-09-20 PROCEDURE — 72100 X-RAY EXAM L-S SPINE 2/3 VWS: CPT

## 2022-10-04 RX ORDER — TIOTROPIUM BROMIDE 18 UG/1
18 CAPSULE ORAL; RESPIRATORY (INHALATION) DAILY
Qty: 30 CAPSULE | Refills: 5 | Status: SHIPPED | OUTPATIENT
Start: 2022-10-04 | End: 2022-10-06

## 2022-10-06 DIAGNOSIS — J41.0 SIMPLE CHRONIC BRONCHITIS (HCC): Primary | ICD-10-CM

## 2022-10-06 DIAGNOSIS — J45.50 SEVERE PERSISTENT ASTHMA WITHOUT COMPLICATION: ICD-10-CM

## 2022-10-06 NOTE — TELEPHONE ENCOUNTER
Pt said wrong Spiriva was sent to pharmacy. She said it should have been the Spiriva Respimat 2.5. Please send new script to pharmacy. Pt states the handihaler makes her gag.

## 2022-10-13 ENCOUNTER — OFFICE VISIT (OUTPATIENT)
Dept: PULMONOLOGY | Age: 57
End: 2022-10-13
Payer: MEDICAID

## 2022-10-13 VITALS
TEMPERATURE: 97.4 F | WEIGHT: 168 LBS | OXYGEN SATURATION: 93 % | DIASTOLIC BLOOD PRESSURE: 67 MMHG | HEART RATE: 65 BPM | SYSTOLIC BLOOD PRESSURE: 108 MMHG | BODY MASS INDEX: 27 KG/M2 | HEIGHT: 66 IN | RESPIRATION RATE: 16 BRPM

## 2022-10-13 DIAGNOSIS — J45.40 MODERATE PERSISTENT ASTHMA WITHOUT COMPLICATION: Primary | ICD-10-CM

## 2022-10-13 DIAGNOSIS — J43.9 BULLA OF LUNG (HCC): ICD-10-CM

## 2022-10-13 DIAGNOSIS — J30.1 SEASONAL ALLERGIC RHINITIS DUE TO POLLEN: ICD-10-CM

## 2022-10-13 DIAGNOSIS — Z87.891 FORMER SMOKER: ICD-10-CM

## 2022-10-13 PROCEDURE — 90471 IMMUNIZATION ADMIN: CPT | Performed by: INTERNAL MEDICINE

## 2022-10-13 PROCEDURE — G8419 CALC BMI OUT NRM PARAM NOF/U: HCPCS | Performed by: INTERNAL MEDICINE

## 2022-10-13 PROCEDURE — 90674 CCIIV4 VAC NO PRSV 0.5 ML IM: CPT | Performed by: INTERNAL MEDICINE

## 2022-10-13 PROCEDURE — 1036F TOBACCO NON-USER: CPT | Performed by: INTERNAL MEDICINE

## 2022-10-13 PROCEDURE — 99213 OFFICE O/P EST LOW 20 MIN: CPT | Performed by: INTERNAL MEDICINE

## 2022-10-13 PROCEDURE — 3023F SPIROM DOC REV: CPT | Performed by: INTERNAL MEDICINE

## 2022-10-13 PROCEDURE — G8427 DOCREV CUR MEDS BY ELIG CLIN: HCPCS | Performed by: INTERNAL MEDICINE

## 2022-10-13 PROCEDURE — G8482 FLU IMMUNIZE ORDER/ADMIN: HCPCS | Performed by: INTERNAL MEDICINE

## 2022-10-13 PROCEDURE — 3017F COLORECTAL CA SCREEN DOC REV: CPT | Performed by: INTERNAL MEDICINE

## 2022-10-13 RX ORDER — MONTELUKAST SODIUM 10 MG/1
TABLET ORAL
Qty: 30 TABLET | Refills: 5 | Status: SHIPPED | OUTPATIENT
Start: 2022-10-13

## 2022-10-13 RX ORDER — CETIRIZINE HYDROCHLORIDE 10 MG/1
10 TABLET ORAL DAILY
Qty: 30 TABLET | Refills: 5 | Status: SHIPPED | OUTPATIENT
Start: 2022-10-13

## 2022-10-13 RX ORDER — LEVALBUTEROL TARTRATE 45 UG/1
2 AEROSOL, METERED ORAL EVERY 6 HOURS PRN
Qty: 1 EACH | Refills: 5 | Status: SHIPPED | OUTPATIENT
Start: 2022-10-13 | End: 2023-10-13

## 2022-10-13 RX ORDER — FREMANEZUMAB-VFRM 225 MG/1.5ML
225 INJECTION SUBCUTANEOUS
COMMUNITY
Start: 2022-05-17

## 2022-10-13 RX ORDER — MULTIVIT WITH MINERALS/LUTEIN
1000 TABLET ORAL DAILY
COMMUNITY

## 2022-10-13 ASSESSMENT — ENCOUNTER SYMPTOMS
BACK PAIN: 0
VOICE CHANGE: 1
COUGH: 0
CHEST TIGHTNESS: 1
SHORTNESS OF BREATH: 1
WHEEZING: 1
RHINORRHEA: 0
SINUS PRESSURE: 1
EYE ITCHING: 1

## 2022-10-13 NOTE — LETTER
[facility] Pulmonary Critical Care and Sleep  [address] [place] [address]  Phone: [phone]  Fax: [phone]    Harris Joseph MD        October 13, 2022     Patient: Soniya Negron   YOB: 1965   Date of Visit: 10/13/2022       To Whom it May Concern:    Diego Warren was seen in my clinic on 10/13/2022. If you have any questions or concerns, please don't hesitate to call.     Sincerely,         Harris Joseph MD

## 2022-10-13 NOTE — PROGRESS NOTES
MA Communication:   The following orders are received by verbal communication from Catrachito Dave MD    Orders include: FU 1 yr      Flu vaccine

## 2022-10-13 NOTE — Clinical Note
[facility] Pulmonary Critical Care and Sleep  [address] [address]  Phone: [phone]  Fax: [phone]    Mickey Servin MD        October 13, 2022     Patient: Brii Ramirez   YOB: 1965   Date of Visit: 10/13/2022       To Whom It May Concern: It is my medical opinion that Ángel Cardozo {Work release (duty restriction):00195}. If you have any questions or concerns, please don't hesitate to call.     Sincerely,        Mickey Servin MD

## 2022-10-13 NOTE — PATIENT INSTRUCTIONS
Remember to bring a list of pulmonary medications and any CPAP or BiPAP machines to your next appointment with the office. Please keep all of your future appointments scheduled by [facility] office. Out of respect for other patients and providers, you may be asked to reschedule your appointment if you arrive later than your scheduled appointment time. Appointments cancelled less than 24hrs in advance will be considered a no show. Patients with three missed appointments within 1 year or four missed appointments within 2 years can be dismissed from the practice. Please be aware that our physicians are required to work in the Intensive Care Unit at [facility].  Your appointment may need to be rescheduled if they are designated to work during your appointment time. You may receive a survey regarding the care you received during your visit. Your input is valuable to us. We encourage you to complete and return your survey. We hope you will choose us in the future for your healthcare needs. Pt instructed of all future appointment dates & times, including radiology, labs, procedures & referrals. If procedures were scheduled preparation instructions provided. Instructions on future appointments with [facility] were given.

## 2022-10-13 NOTE — PROGRESS NOTES
Pulmonary Outpatient Note   Daily Araujo MD       10/13/2022    1. Moderate persistent asthma without complication    2. Bulla of lung ([address]    3. Seasonal allergic rhinitis due to pollen    4. Former smoker          ASSESSMENT/PLAN:  Moderate persistent asthma. Continue with bronchodilators, refill medications  Bulla left lung, emphysema, possible COPD. Confirmed on CT chest.  She also has minimal upper lobe emphysema, mostly paraseptal, minimal centrilobular. PFT results discussed with her. Alpha-1 antitrypsin level was normal.  Repeat imaging is not required at present  Seasonal allergic rhinitis. Recommend nasal steroid during the season. On Singulair and Zyrtec. Former smoker. No lung nodules on last CT. Prophylaxis. Has received Prevnar, Pneumovax. She contracted COVID-19 infection in February. She should consider the new COVID-19 vaccine as a booster, she was administered the flu shot today without adverse event. RTC 1 year call earlier if symptomatic. Chief Complaint:  Follow-up (6 mo) and Asthma       HPI: Duke Walton is a 61y.o. year old female with moderate persistent asthma, left lower lobe bulla, seasonal allergic rhinitis and multiple other medical issues. She is a prior smoker. Her PCP is Dr. Janee Grant. Jarad Rothman was last seen on 4/12/2022, presents for 6-month follow-up visit. She developed COVID-19 infection in February, was sick for 3-1/2 weeks. She did not receive Paxlovid. Recently when she was out of her Spiriva, she increased the use of her levalbuterol. She says she does well with Spiriva. She rarely uses rescue levalbuterol if she develops chest tightness. She sometimes wakes up with chest tightness. She does not use bronchodilator close to bedtime as it keeps her awake and gets her palpitations, makes her shaky. She has no residual symptoms from her COVID-19 infection, had not lost her taste. She denied GI or  complaints.   She had an episode of food poisoning after eating at [employer]. She has morning nasal dryness. She does have a cat in the home. The patient was to undergo allergy testing, but it was declined as she was told the Seroquel would interfere with her tests. The patient says she had severe laryngeal thrush, was placed on Diflucan for almost 6 months by ENT. The rest of her ROS was unremarkable. Previous notes reviewed and edited as necessary. Angel Burns seen by me by virtual visit in 12/20, then by Dr. Tod Bahena on 5/3/2021 for preoperative clearance for shoulder surgery. She tolerated the surgery well. She has CAD, undergoing medical management. She has 5-pack-year history of smoking, has not smoked since 2007. The patient drove a bus in the past, now does food delivery. The patient uses levalbuterol MDI occasionally, never albuterol nebulizer during the spring season about once a week if the need arises. Presently she has increased allergic symptoms involving her eye, she has post nasal drip. Symptoms are worse when the pollen counts are higher. She does have some wheezing. Occasionally in the morning and at night she has a sensation of chest tightness as though someone is sitting on her chest.  Overall, her shortness of breath is not significantly worse. She could not complete allergy testing due to the fact she was on quetiapine. The patient did have COVID-19 infection in February this year, symptoms are possibly worse since then. She did live in a house that had some mold about 5 years ago. At her last visit with me, she was recommended alpha-1 antitrypsin testing due to her bulla. PFT 12/21/2020  FVC 3.65 L, 96% predicted, FEV1 2.86 L, 97% predicted, with ratio of 78%. There was no response to bronchodilator. Lung volumes and diffusion capacity were normal.    Alpha-1 antitrypsin was normal    Previous note by me reviewed and edited as necessary. Angel Burns was seen by virtual visit on 6/25/2020.   Since her last visit, the patient says she has a consistent pattern of increased dyspnea during the summer and spring. She cannot do outdoor when the pollen count is high. She notes constriction in her chest, shortness of breath, eye symptoms and nasal congestion. She also notes chest pain. She feels better when she wears a mask. She uses levalbuterol MDI, more in the spring and summer hours. When symptoms are worse, she needs a nebulizer. 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 She has increased symptoms when she is exposed to cleaning solutions and sprays. She is mildly short of breath climbing up stairs, but not the fewer stairs that she has in her home. He has gained about 15 pounds since the COVID-19 situation. She does snore at night. He also has issues with insomnia. She has a good appetite, no GI or  complaints. The rest of her ROS was negative. Her medications and allergies were reviewed. Previous notes reviewed and edited as necessary. Kathy Christie was recently seen by Dr. Clayburn Seip on 6/2, since she had failed usual treatment for asthma, she had been referred to an allergist and allergen profile was ordered. Prednisone taper was given to be started after lab work was completed. Xopenex was refilled and she was asked to come back if she did not improve. CXR and possible bronchoscopy was to be considered if she failed to improve. She says her asthma did improve, but she has had increased symptoms again along with pleuritic chest pain on the right. She denies any fever or chills. She has minimal cough at night with yellowish-green phlegm, denies hemoptysis. She also mentions that she cannot raise her right arm as there is pain under the ribs when she moves her arm. The patient describes multiple episodes of \"pleurisy\". Her PFT 6/27/2017 has shown FEV1 1.34 L, 44% predicted. There was a large bronchodilator response. Last CXR was on 4/2/2019. She has not smoked for the last 12 years. She smoked on and off, having quit for 15 years, 2 years intermittently. She smoked about 1.5 PPD for 10 years. On 6/2/2020, CBC was normal, IgE and allergen profile were normal.  Last CT on 7/20/2017 showed possible left lower lobe bulla. The patient gets her pain medications from Dr. Arron Suggs in [place], she saw him when she was living in [place]. Migraine, neck and back pain with narcotic use, insomnia for which she is on Seroquel. Previous notes reviewed and edited as necessary. Alex Vazquez is a 58-year-old female being seen for my partner Dr. Julia Ramon. She was last seen by him on 9/27/17. She is being followed for asthma, was well controlled on Dulera and when necessary Xopenex. At that visit she had described snoring, strong family history and had daytime somnolence. She had been asked to continue her bronchodilator regimen, and was asked to get evaluated for VADIM. About 1-1.5 weeks ago, she developed an upper respiratory tract infection involving her upper airway, and simultaneously had chest infection. She had cough with phlegm that is dark yellowish. She denied hemoptysis. She was feverish, but did not have a thermometer, when she did buy a thermometer, her fever was gone. She had lost her voice, and this is slightly improved, she missed one day of work last week. Her boyfriend got sick after her, there was no recent travel or other sick contacts. Her boyfriend does not smoke. She also had increased dyspnea, and was using her rescue inhaler very frequently, up to 3 times a day. She has a spacer that she uses on her albuterol, but not on Dulera. She was compliant with the [facility]. She denied GI or  complaints. The patient gives a history of smoking up to 1.5 pack per day, but for less than 10 years intermittently. She has not smoked for more than 15 years. I reviewed her CT chest and her PFT.   Her last PFT on 6/27/17 showed FEV1 of 1.34 L, 44% predicted, with a good bronchodilator response. TLC was reduced at 77% predicted, there was evidence of air trapping. There was a significant decline in her TLC from 6/29/10.     Past Medical History:   Diagnosis Date    Asthma     Back pain     COVID-19     Headache     Neck pain        Past Surgical History:   Procedure Laterality Date    FOOT SURGERY      right    HIP SURGERY      HYSTERECTOMY (CERVIX STATUS UNKNOWN)      NECK SURGERY      NECK SURGERY         Social History     Tobacco Use    Smoking status: Former     Packs/day: 0.50     Years: 10.00     Pack years: 5.00     Types: Cigarettes     Start date: 7/11/1977     Quit date: 1/1/2007     Years since quitting: 15.8    Smokeless tobacco: Never   Substance Use Topics    Alcohol use: Not Currently       Family History   Problem Relation Age of Onset    High Blood Pressure Father     Heart Disease Father         aorta ruptured    Heart Disease Maternal Grandmother         pacemaker    Breast Cancer Maternal Aunt          Current Outpatient Medications:     vitamin E 1000 units capsule, Take 1,000 Units by mouth daily, Disp: , Rfl:     Fremanezumab-vfrm (AJOVY) 225 MG/1.5ML SOSY, Inject 225 mg into the skin every 30 days, Disp: , Rfl:     levalbuterol (XOPENEX HFA) 45 MCG/ACT inhaler, Inhale 2 puffs into the lungs every 6 hours as needed for Wheezing or Shortness of Breath, Disp: 1 each, Rfl: 5    montelukast (SINGULAIR) 10 MG tablet, TAKE ONE TABLET BY MOUTH ONCE NIGHTLY, Disp: 30 tablet, Rfl: 5    cetirizine (ZYRTEC) 10 MG tablet, Take 1 tablet by mouth daily, Disp: 30 tablet, Rfl: 5    tiotropium (SPIRIVA RESPIMAT) 2.5 MCG/ACT AERS inhaler, Inhale 2 puffs into the lungs daily, Disp: 1 each, Rfl: 5    aspirin (ASPIRIN LOW DOSE) 81 MG EC tablet, TAKE ONE TABLET BY MOUTH DAILY, Disp: 90 tablet, Rfl: 3    levalbuterol (XOPENEX) 1.25 MG/3ML nebulizer solution, Take 3 mLs by nebulization every 8 hours as needed for Wheezing or Shortness of Breath, Disp: 720 mL, Rfl: 5    Prenatal Vit-Fe Fumarate-FA (PRENATAL COMPLETE PO), Take by mouth, Disp: , Rfl:     OnabotulinumtoxinA (BOTOX IJ), Inject as directed, Disp: , Rfl:     Ketorolac Tromethamine (TORADOL IJ), Inject as directed as needed, Disp: , Rfl:     morphine (MS CONTIN) 30 MG extended release tablet, Take 30 mg by mouth nightly., Disp: , Rfl:     Biotin 1000 MCG TABS, Take 1,000 mcg by mouth nightly, Disp: , Rfl:     Vitamin D (CHOLECALCIFEROL) 25 MCG (1000 UT) TABS tablet, Take 1,000 Units by mouth daily, Disp: , Rfl:     ketorolac (TORADOL) 10 MG tablet, as needed, Disp: , Rfl:     tiZANidine (ZANAFLEX) 4 MG tablet, Take 4 mg by mouth nightly , Disp: , Rfl:     omeprazole (PRILOSEC) 20 MG delayed release capsule, Take 20 mg by mouth 2 times daily , Disp: , Rfl: 5    naloxegol (MOVANTIK) 25 MG TABS tablet, Take 25 mg by mouth every morning, Disp: , Rfl:     Multiple Vitamins-Minerals (THERAPEUTIC MULTIVITAMIN-MINERALS) tablet, Take 1 tablet by mouth daily, Disp: , Rfl:     Ascorbic Acid (VITAMIN C) 250 MG tablet, Take 1,000 mg by mouth daily, Disp: , Rfl:     estrogens, conjugated, (PREMARIN) 1.25 MG tablet, Take by mouth, Disp: , Rfl:     morphine (AVINZA) 60 MG extended release capsule, Take 60 mg by mouth daily. , Disp: , Rfl:     SUMAtriptan (IMITREX) 100 MG tablet, Take 200 mg by mouth once as needed. , Disp: , Rfl:     SUMAtriptan (IMITREX) 5 MG/ACT nasal spray, 1 spray by Nasal route daily as needed 20 mg nasal spray, Disp: , Rfl:     QUEtiapine (SEROQUEL) 50 MG tablet, Take 50 mg by mouth nightly , Disp: , Rfl:     topiramate (TOPAMAX) 200 MG tablet, Take 200 mg by mouth daily , Disp: , Rfl:     Latex, Acyclovir, Albuterol, Astemizole, Food color pink, Iv dye [iodides], Other, Penicillins, Statins, Sulfa antibiotics, Tobramycin-dexamethasone, and Codeine    Vitals:    10/13/22 1325   BP: 108/67   Pulse: 65   Resp: 16   Temp: 97.4 °F (36.3 °C)   TempSrc: Temporal   SpO2: 93%

## 2022-10-27 ENCOUNTER — TELEPHONE (OUTPATIENT)
Dept: PULMONOLOGY | Age: 57
End: 2022-10-27

## 2022-10-27 NOTE — TELEPHONE ENCOUNTER
NO PRIOR AUTH NEEDED -  Tiotropium Bromide - per [place] Department of Medicaid.   Scanned into media

## 2023-04-07 RX ORDER — CETIRIZINE HYDROCHLORIDE 10 MG/1
TABLET ORAL
Qty: 30 TABLET | Refills: 5 | Status: SHIPPED | OUTPATIENT
Start: 2023-04-07

## 2023-05-05 RX ORDER — MONTELUKAST SODIUM 10 MG/1
TABLET ORAL
Qty: 90 TABLET | Refills: 1 | Status: SHIPPED | OUTPATIENT
Start: 2023-05-05

## 2023-05-15 ENCOUNTER — OFFICE VISIT (OUTPATIENT)
Dept: CARDIOLOGY CLINIC | Age: 58
End: 2023-05-15
Payer: MEDICAID

## 2023-05-15 VITALS
HEART RATE: 80 BPM | HEIGHT: 66 IN | BODY MASS INDEX: 26.2 KG/M2 | OXYGEN SATURATION: 98 % | WEIGHT: 163 LBS | DIASTOLIC BLOOD PRESSURE: 72 MMHG | SYSTOLIC BLOOD PRESSURE: 120 MMHG

## 2023-05-15 DIAGNOSIS — I25.10 CORONARY ARTERY DISEASE INVOLVING NATIVE CORONARY ARTERY OF NATIVE HEART WITHOUT ANGINA PECTORIS: ICD-10-CM

## 2023-05-15 DIAGNOSIS — Z01.810 PREOP CARDIOVASCULAR EXAM: ICD-10-CM

## 2023-05-15 DIAGNOSIS — Z01.810 PREOPERATIVE CARDIOVASCULAR EXAMINATION: Primary | ICD-10-CM

## 2023-05-15 PROCEDURE — G8427 DOCREV CUR MEDS BY ELIG CLIN: HCPCS | Performed by: INTERNAL MEDICINE

## 2023-05-15 PROCEDURE — 1036F TOBACCO NON-USER: CPT | Performed by: INTERNAL MEDICINE

## 2023-05-15 PROCEDURE — 3017F COLORECTAL CA SCREEN DOC REV: CPT | Performed by: INTERNAL MEDICINE

## 2023-05-15 PROCEDURE — 99214 OFFICE O/P EST MOD 30 MIN: CPT | Performed by: INTERNAL MEDICINE

## 2023-05-15 PROCEDURE — 93000 ELECTROCARDIOGRAM COMPLETE: CPT | Performed by: INTERNAL MEDICINE

## 2023-05-15 PROCEDURE — G8419 CALC BMI OUT NRM PARAM NOF/U: HCPCS | Performed by: INTERNAL MEDICINE

## 2023-05-15 RX ORDER — ASPIRIN 81 MG/1
TABLET ORAL
Qty: 30 TABLET | Refills: 0 | Status: SHIPPED | OUTPATIENT
Start: 2023-05-15 | End: 2023-05-15 | Stop reason: SDUPTHER

## 2023-05-15 RX ORDER — ASPIRIN 81 MG/1
TABLET ORAL
Qty: 90 TABLET | Refills: 3 | Status: SHIPPED | OUTPATIENT
Start: 2023-05-15

## 2023-05-15 RX ORDER — EZETIMIBE 10 MG/1
10 TABLET ORAL DAILY
Qty: 90 TABLET | Refills: 3 | Status: SHIPPED | OUTPATIENT
Start: 2023-05-15

## 2023-05-15 NOTE — TELEPHONE ENCOUNTER
10/15/2021 [facility]    Please contact pt for yearly appt/med refill appt with [facility] [place]. Please note the date and route back.

## 2023-05-15 NOTE — PROGRESS NOTES
[place] 3/19/2021  Impression:  1. Mild non-obstructive coronary artery disease. 2. Normal LVEDP. Latest Reference Range & Units 03/19/21 07:20   CHOLESTEROL, TOTAL, 207644 0 - 199 mg/dL 218 (H)   HDL Cholesterol 40 - 60 mg/dL 73 (H)   LDL Calculated <100 mg/dL 123 (H)   Triglycerides 0 - 150 mg/dL 108   VLDL Cholesterol Calculated Not Established mg/dL 22       Assessment:   Preoperative cardiac risk assessment for elbow surgery - low rtisk  SOB due to asthma. Chronic, unchanged   Asthma   Abnormal GXT stress test -  False poaitive  CAD - stable. Nonobstructive. No angina  RBBB  Statin intolerance    Plan:  ~Ok for surgery- you can hold Aspirin for one week prior   ~Discussed Repatha therapy- will hold off for now. start Zetia 10 mg daily. ~Repeat labs in 3 months after starting or changing Statin therapy- Fasting lipids and CMP   Cardiac medications reviewed including indications and pertinent side effects. Medication list updated at this visit. Check blood pressure and heart rate at home a few times per week- keep a log with dates and times and bring to office visit   Regular exercise and following a healthy diet encouraged   Follow up with me in 1 year     Scribe's attestation: This note was scribed in the presence of Dr. Shon Valenzuela M.D. By Chery Up RN    The scribes documentation has been prepared under my direction and personally reviewed by me in its entirety. I confirm that the note above accurately reflects all work, treatment, procedures, and medical decision making performed by me. Dr. Shon Valenzuela MD'    Thank you for allowing me to participate in the care of this individual.      Owen Ugalde.  Sahil Farias M.D., Angelina Petersen

## 2023-05-15 NOTE — TELEPHONE ENCOUNTER
Pt has been scheduled for 05/15 w/[facility] [place].  Please review RX request.

## 2023-05-15 NOTE — PATIENT INSTRUCTIONS
Plan:  ~Ok for surgery- you can hold ASpirin for one week prior   ~Discussed Repatha therapy- will hold off for now. start Zetia 10 mg daily. ~Repeat labs in 3 months after starting or changing Statin therapy- Fasting lipids and CMP   Cardiac medications reviewed including indications and pertinent side effects. Medication list updated at this visit.    Check blood pressure and heart rate at home a few times per week- keep a log with dates and times and bring to office visit   Regular exercise and following a healthy diet encouraged   Follow up with me in 1 year

## 2023-05-17 ENCOUNTER — TELEPHONE (OUTPATIENT)
Dept: CARDIOLOGY CLINIC | Age: 58
End: 2023-05-17

## 2023-05-17 NOTE — TELEPHONE ENCOUNTER
Pt called i and stated that Dr. Bubba Mccoy office needs a copy of the ekg that was completed on 5/15.  Ekg was faxed on 5/17 to [phone]

## 2023-06-14 PROBLEM — Z01.810 PREOP CARDIOVASCULAR EXAM: Status: RESOLVED | Noted: 2023-05-15 | Resolved: 2023-06-14

## 2023-06-28 ENCOUNTER — HOSPITAL ENCOUNTER (OUTPATIENT)
Dept: GENERAL RADIOLOGY | Age: 58
Discharge: HOME | End: 2023-06-28
Payer: MEDICAID

## 2023-06-28 DIAGNOSIS — R10.84 ABDOMINAL PAIN, GENERALIZED: ICD-10-CM

## 2023-06-28 PROCEDURE — 74018 RADEX ABDOMEN 1 VIEW: CPT

## 2023-07-10 ENCOUNTER — ANCILLARY PROCEDURE (OUTPATIENT)
Dept: RADIOLOGY | Facility: URGENT CARE | Age: 58
End: 2023-07-10

## 2023-07-10 ENCOUNTER — OFFICE VISIT (OUTPATIENT)
Dept: URGENT CARE | Facility: URGENT CARE | Age: 58
End: 2023-07-10

## 2023-07-10 VITALS
DIASTOLIC BLOOD PRESSURE: 68 MMHG | SYSTOLIC BLOOD PRESSURE: 102 MMHG | TEMPERATURE: 97.5 F | OXYGEN SATURATION: 99 % | RESPIRATION RATE: 16 BRPM | HEART RATE: 59 BPM

## 2023-07-10 DIAGNOSIS — R07.9 CHEST PAIN, UNSPECIFIED TYPE: Primary | ICD-10-CM

## 2023-07-10 LAB
ALBUMIN SERPL-MCNC: 2.7 G/DL (ref 3.3–5.5)
ALP SERPL-CCNC: 88 U/L (ref 42–141)
ALT SERPL-CCNC: 17 U/L (ref 10–47)
ANION GAP SERPL CALC-SCNC: 9 MMOL/L (ref 3–11)
AST SERPL-CCNC: 26 U/L
BASOPHILS # BLD AUTO: 0.01 10*3/UL
BASOPHILS NFR BLD AUTO: 0.3 % (ref 0–2)
BILIRUB SERPL-MCNC: 0.4 MG/DL (ref 0.2–1.4)
BUN SERPL-MCNC: 17 MG/DL (ref 7–25)
CALCIUM ALBUM COR SERPL-MCNC: 10 MG/DL (ref 8.6–10.3)
CALCIUM SERPL-MCNC: 9 MG/DL (ref 8.6–10.3)
CHLORIDE SERPL-SCNC: 110 MMOL/L (ref 98–107)
CO2 SERPL-SCNC: 26 MMOL/L (ref 21–32)
CREAT SERPL-MCNC: 1 MG/DL (ref 0.6–1.1)
EOSINOPHIL # BLD AUTO: 0.17 10*3/UL
EOSINOPHIL NFR BLD AUTO: 4.4 % (ref 0–3)
ERYTHROCYTE [DISTWIDTH] IN BLOOD BY AUTOMATED COUNT: 13.2 % (ref 11.5–14)
GFR SERPL CREATININE-BSD FRML MDRD: 65 ML/MIN/1.73M*2
GLUCOSE SERPL-MCNC: 105 MG/DL (ref 70–105)
HCT VFR BLD AUTO: 37.1 % (ref 34–45)
HGB BLD-MCNC: 12.4 G/DL (ref 11.5–15.5)
LYMPHOCYTES # BLD AUTO: 1.89 10*3/UL
LYMPHOCYTES NFR BLD AUTO: 47.3 % (ref 11–47)
MCH RBC QN AUTO: 30.1 PG (ref 28–33)
MCHC RBC AUTO-ENTMCNC: 33.4 G/DL (ref 32–36)
MCV RBC AUTO: 90.3 FL (ref 81–97)
MONOCYTES # BLD AUTO: 0.3 10*3/UL
MONOCYTES NFR BLD AUTO: 7.6 % (ref 3–11)
NEUTROPHILS # BLD AUTO: 1.63 10*3/UL
NEUTROPHILS NFR BLD AUTO: 40.6 % (ref 41–81)
PLATELET # BLD AUTO: 194 10*3/UL (ref 140–350)
PMV BLD AUTO: 9.4 FL (ref 6.9–10.8)
POTASSIUM SERPL-SCNC: 4.4 MMOL/L (ref 3.5–5.1)
PROT SERPL-MCNC: 5.7 G/DL (ref 6.4–8.1)
RBC # BLD AUTO: 4.11 10*6/ΜL (ref 3.7–5.3)
SODIUM SERPL-SCNC: 145 MMOL/L (ref 128–145)
TROPONIN I SERPL-MCNC: <2.3 PG/ML
WBC # BLD AUTO: 4 10*3/UL (ref 4.5–10.5)

## 2023-07-10 PROCEDURE — 80053 COMPREHEN METABOLIC PANEL: CPT | Performed by: PHYSICIAN ASSISTANT

## 2023-07-10 PROCEDURE — 93005 ELECTROCARDIOGRAM TRACING: CPT | Performed by: PHYSICIAN ASSISTANT

## 2023-07-10 PROCEDURE — 71046 X-RAY EXAM CHEST 2 VIEWS: CPT | Mod: TC | Performed by: PHYSICIAN ASSISTANT

## 2023-07-10 PROCEDURE — 84484 ASSAY OF TROPONIN QUANT: CPT | Performed by: PHYSICIAN ASSISTANT

## 2023-07-10 PROCEDURE — 99203 OFFICE O/P NEW LOW 30 MIN: CPT | Performed by: PHYSICIAN ASSISTANT

## 2023-07-10 PROCEDURE — 85025 COMPLETE CBC W/AUTO DIFF WBC: CPT | Performed by: PHYSICIAN ASSISTANT

## 2023-07-10 PROCEDURE — 36415 COLL VENOUS BLD VENIPUNCTURE: CPT | Performed by: PHYSICIAN ASSISTANT

## 2023-07-10 RX ORDER — MORPHINE SULFATE 60 MG/1
CAPSULE, EXTENDED RELEASE ORAL
COMMUNITY

## 2023-07-10 RX ORDER — MONTELUKAST SODIUM 10 MG/1
TABLET ORAL
COMMUNITY
Start: 2023-05-05

## 2023-07-10 RX ORDER — FLUTICASONE FUROATE 27.5 UG/1
SPRAY, METERED NASAL
COMMUNITY

## 2023-07-10 RX ORDER — ONDANSETRON 4 MG/1
TABLET, FILM COATED ORAL
COMMUNITY
Start: 2023-05-23

## 2023-07-10 RX ORDER — NALOXEGOL OXALATE 25 MG/1
1 TABLET, FILM COATED ORAL DAILY
COMMUNITY
Start: 2023-07-07

## 2023-07-10 RX ORDER — CETIRIZINE HYDROCHLORIDE 10 MG/1
10 TABLET ORAL DAILY
COMMUNITY
Start: 2023-07-03

## 2023-07-10 RX ORDER — MORPHINE SULFATE 30 MG/1
30 TABLET, FILM COATED, EXTENDED RELEASE ORAL DAILY
COMMUNITY
Start: 2023-07-07

## 2023-07-10 RX ORDER — QUETIAPINE FUMARATE 50 MG/1
50 TABLET, FILM COATED ORAL NIGHTLY
COMMUNITY
Start: 2023-07-03

## 2023-07-10 RX ORDER — ASCORBIC ACID 250 MG
4 TABLET ORAL DAILY
COMMUNITY

## 2023-07-10 RX ORDER — TIOTROPIUM BROMIDE INHALATION SPRAY 3.12 UG/1
SPRAY, METERED RESPIRATORY (INHALATION)
COMMUNITY
Start: 2023-07-08

## 2023-07-10 RX ORDER — KETOROLAC TROMETHAMINE 10 MG/1
10 TABLET, FILM COATED ORAL 3 TIMES DAILY
COMMUNITY
Start: 2023-05-15

## 2023-07-10 RX ORDER — LEVALBUTEROL INHALATION SOLUTION 1.25 MG/3ML
SOLUTION RESPIRATORY (INHALATION)
COMMUNITY

## 2023-07-10 RX ORDER — ALBUTEROL SULFATE 1.25 MG/3ML
1 SOLUTION RESPIRATORY (INHALATION)
COMMUNITY

## 2023-07-10 RX ORDER — SYRINGE W-NEEDLE,DISPOSAB,3 ML 25GX5/8"
SYRINGE, EMPTY DISPOSABLE MISCELLANEOUS
COMMUNITY
Start: 2023-04-07

## 2023-07-10 RX ORDER — ESTROGENS, CONJUGATED 1.25 MG/1
1.25 TABLET, FILM COATED ORAL DAILY
COMMUNITY
Start: 2023-07-03

## 2023-07-10 RX ORDER — RIBOFLAVIN (VITAMIN B2) 400 MG
400 TABLET ORAL DAILY
COMMUNITY
Start: 2023-04-06

## 2023-07-10 RX ORDER — ASPIRIN 81 MG/1
81 TABLET ORAL DAILY
COMMUNITY
Start: 2023-05-16

## 2023-07-10 RX ORDER — CHOLECALCIFEROL (VITAMIN D3) 25 MCG
1 TABLET ORAL DAILY
COMMUNITY

## 2023-07-10 RX ORDER — TIZANIDINE 4 MG/1
4 TABLET ORAL 3 TIMES DAILY
COMMUNITY
Start: 2023-07-06

## 2023-07-10 RX ORDER — KETOROLAC TROMETHAMINE 30 MG/ML
INJECTION, SOLUTION INTRAMUSCULAR; INTRAVENOUS
COMMUNITY
Start: 2023-05-15

## 2023-07-10 RX ORDER — PNV NO.95/FERROUS FUM/FOLIC AC 28MG-0.8MG
1 TABLET ORAL DAILY
COMMUNITY

## 2023-07-10 RX ORDER — PREDNISONE 20 MG/1
TABLET ORAL
COMMUNITY

## 2023-07-10 RX ORDER — METHOCARBAMOL 500 MG/1
TABLET, FILM COATED ORAL
COMMUNITY

## 2023-07-10 RX ORDER — DICLOFENAC SODIUM 75 MG/1
75 TABLET, DELAYED RELEASE ORAL 2 TIMES DAILY
COMMUNITY
Start: 2023-07-02

## 2023-07-10 RX ORDER — NAPROXEN SODIUM 220 MG/1
243 TABLET, FILM COATED ORAL ONCE
Status: COMPLETED | OUTPATIENT
Start: 2023-07-10 | End: 2023-07-10

## 2023-07-10 RX ORDER — IPRATROPIUM BROMIDE 0.5 MG/2.5ML
SOLUTION RESPIRATORY (INHALATION)
COMMUNITY

## 2023-07-10 RX ORDER — MELOXICAM 15 MG/1
TABLET ORAL
COMMUNITY

## 2023-07-10 RX ORDER — OXYCODONE AND ACETAMINOPHEN 5; 325 MG/1; MG/1
TABLET ORAL
COMMUNITY
Start: 2023-05-24

## 2023-07-10 RX ORDER — ERENUMAB-AOOE 140 MG/ML
INJECTION, SOLUTION SUBCUTANEOUS
COMMUNITY

## 2023-07-10 RX ORDER — PROPRANOLOL HYDROCHLORIDE 20 MG/1
TABLET ORAL
COMMUNITY

## 2023-07-10 RX ORDER — FREMANEZUMAB-VFRM 225 MG/1.5ML
INJECTION SUBCUTANEOUS
COMMUNITY
Start: 2023-05-16

## 2023-07-10 RX ORDER — SUMATRIPTAN 20 MG/1
SPRAY NASAL
COMMUNITY
Start: 2023-07-03

## 2023-07-10 RX ORDER — PANTOPRAZOLE SODIUM 40 MG/1
40 TABLET, DELAYED RELEASE ORAL DAILY
COMMUNITY
Start: 2023-06-28

## 2023-07-10 RX ORDER — NICOTINE POLACRILEX 2 MG
1 GUM BUCCAL
COMMUNITY

## 2023-07-10 RX ORDER — TOPIRAMATE 200 MG/1
200 TABLET ORAL DAILY
COMMUNITY
Start: 2023-07-03

## 2023-07-10 RX ORDER — EZETIMIBE 10 MG/1
10 TABLET ORAL DAILY
COMMUNITY
Start: 2023-05-15

## 2023-07-10 RX ORDER — OMEPRAZOLE 20 MG/1
20 CAPSULE, DELAYED RELEASE ORAL 2 TIMES DAILY
COMMUNITY
Start: 2023-06-22

## 2023-07-10 RX ADMIN — NAPROXEN SODIUM 243 MG: 220 TABLET, FILM COATED ORAL at 11:12

## 2023-07-10 NOTE — PROGRESS NOTES
Subjective      Sangita Gambino is a 57 y.o. female who presents for Chest Pain (C/o middle sternum chest pain that started yesterday. Back pain. Fatigue, SOB, squeezing chest pain. Pt chewed spiring this morning. Has cardiac history - chest pain. Leg swelling. )  .      HPI    Patient presents to the Urgent Care clinic today with complaints of chest pain.       Patient presents today complaining of midsternal radiating chest pain to the left chest wall and left side of her back that started yesterday while packing up her hotel room.  Patient states that she has had associated shortness of breath, fatigue bilateral swelling in her legs with increased generalized weakness.  She does report some nausea without vomiting.  She denies abdominal pain urinary symptoms or diarrhea.  She reports bilateral feet swelling but no pain in her ankles or calves.  She denies any redness in her lower extremities.  Patient is traveling from [place].  She does have an extensive Ortho history with hardware placed in cervical spine and is on multiple daily pain medications including twice daily extended release morphine, muscle relaxers, Topamax for migraines and pain, Seroquel.  Patient also has a history of asthma states that symptoms did mildly improve yesterday after rescue inhaler.  She is on levalbuterol as well as daily Spiriva.    Patient denies any upper respiratory symptoms including cough, sore throat nasal congestion.          The following have been reviewed and updated as appropriate in this visit:                  Current Outpatient Medications   Medication Sig Dispense Refill    albuterol 1.25 mg/3 mL nebulizer solution Inhale 1 ampule      ascorbic acid, vitamin C, (VITAMIN C) 250 mg tablet Take 4 tablets (1,000 mg total) by mouth daily      aspirin 81 mg EC tablet Take 1 tablet (81 mg total) by mouth daily      biotin 1 mg capsule Take 1 mg by mouth      morphine (MS CONTIN) 30 mg 12 hr tablet Take 1 tablet (30  mg total) by mouth daily      morphine (AVINza) 60 mg 24 hr capsule       montelukast (SINGULAIR) 10 mg tablet       cetirizine (ZyrTEC) 10 mg tablet Take 1 tablet (10 mg total) by mouth daily      cholecalciferol, vitamin D3, 25 mcg (1,000 unit) tablet Take 1 tablet (1,000 Units total) by mouth daily      diclofenac (VOLTAREN) 75 mg EC tablet Take 1 tablet (75 mg total) by mouth 2 (two) times a day      erenumab-aooe (Aimovig Autoinjector) 140 mg/mL auto-injector       Premarin 1.25 mg tablet Take 1 tablet (1.25 mg total) by mouth daily      ezetimibe (ZETIA) 10 mg tablet Take 1 tablet (10 mg total) by mouth daily      fluticasone (Flonase Sensimist) 27.5 mcg/actuation nasal spray       influenza vaccine (FLUBLOK QUAD) 180 mcg (45 mcg x 4)/0.5 mL syringe injection (18 years and up)       Ajovy Syringe 225 mg/1.5 mL syringe SMARTSIG:SUB-Q      CHARLES-5HTP-theanine-taur-mv-min 10-89- mg capsule Take by mouth daily      ipratropium (ATROVENT) 0.02 % nebulizer solution       ketorolac (TORADOL) 30 mg/mL injection       ketorolac (TORADOL) 10 mg tablet Take 1 tablet (10 mg total) by mouth 3 (three) times a day      levalbuterol (XOPENEX) 1.25 mg/3 mL nebulizer solution       meloxicam (MOBIC) 15 mg tablet       methocarbamoL (ROBAXIN) 500 mg tablet       multivitamin-Ca-iron-minerals 18-0.4 mg tablet Take 1 tablet by mouth      Movantik 25 mg tablet Take 1 tablet (25 mg total) by mouth daily      omeprazole (PriLOSEC) 20 mg capsule Take 1 capsule (20 mg total) by mouth 2 (two) times a day      ondansetron (ZOFRAN) 4 mg tablet SMARTSIG:Tablet(s) By Mouth      oxyCODONE-acetaminophen (PERCOCET) 5-325 mg per tablet SMARTSIG:Tablet(s) By Mouth      pantoprazole (PROTONIX) 40 mg EC tablet Take 1 tablet (40 mg total) by mouth daily      predniSONE (DELTASONE) 20 mg tablet       propranoloL (INDERAL) 20 mg tablet       QUEtiapine (SEROquel) 50 mg tablet Take 1 tablet (50 mg total) by mouth nightly      riboflavin, vitamin  "B2, 400 mg tablet Take 400 mg by mouth daily      SUMAtriptan (IMITREX) 20 mg/actuation nasal spray SMARTSIG:Both Nares      syringe with needle 3 mL 23 gauge x 1 1/2\" syringe Syringes and needles for use with Ketorolac      Spiriva Respimat 2.5 mcg/actuation mist SMARTSI Puff(s) Via Inhaler Daily      tiZANidine (ZANAFLEX) 4 mg tablet Take 1 tablet (4 mg total) by mouth 3 (three) times a day      topiramate (TOPAMAX) 200 mg tablet Take 1 tablet (200 mg total) by mouth daily      vitamin E 670 mg (1,000 unit) capsule Take 1 capsule (1,000 Units total) by mouth daily       No current facility-administered medications for this visit.         Review of Systems  See HPI    Objective     Vitals:  /68   Pulse 70   Temp 36.4 °C (97.5 °F) (Temporal)   Resp 16   SpO2 97%       Physical Exam  Vitals and nursing note reviewed.   Constitutional:       General: She is not in acute distress.     Appearance: She is not ill-appearing, toxic-appearing or diaphoretic.   Eyes:      Extraocular Movements:      Right eye: Normal extraocular motion.      Left eye: Normal extraocular motion.      Comments: Constricted round pupils however equal and reactive   Neck:      Vascular: No carotid bruit.   Cardiovascular:      Rate and Rhythm: Normal rate and regular rhythm.      Pulses: Normal pulses.      Heart sounds: Normal heart sounds. No murmur heard.     No friction rub.   Pulmonary:      Effort: Pulmonary effort is normal.      Breath sounds: Normal breath sounds.   Abdominal:      General: Abdomen is flat. Bowel sounds are normal.      Palpations: Abdomen is soft.      Tenderness: There is abdominal tenderness. There is left CVA tenderness. There is no right CVA tenderness.   Musculoskeletal:      Right lower leg: No edema.      Left lower leg: No edema.      Comments: Negative Homans bilaterally   Skin:     General: Skin is warm and dry.   Neurological:      General: No focal deficit present.      Mental Status: She is " alert and oriented to person, place, and time.   Psychiatric:         Mood and Affect: Affect is flat.         Speech: Speech is delayed.         Behavior: Behavior is slowed. Behavior is not withdrawn. Behavior is cooperative.         Thought Content: Thought content normal.         Cognition and Memory: Cognition normal.           No results found for this or any previous visit (from the past 24 hour(s)).      Assessment/Plan     Diagnoses and all orders for this visit:    Chest pain, unspecified type  -     ECG 12 lead - Normal, Today            MDM    57-year-old female presents today for chest pain shortness of breath fatigue started yesterday.  She also reports intermittent nausea upon waking this morning.  She does have a history significant for chronic pain, granuloma in the lung, asthma.  She also reports history of migraines.  States that she had persistent squeezing sensation in the chest radiating to her left chest wall into her left back.  She also reports increased swelling in the lower extremities.  Physical exam revealed a withdrawn and slowed female in no acute distress nontoxic in appearance.  Skin warm dry and appropriate color.  Pupils constricted but reactive and round.  EOMs intact.  No gross neurological deficits noted on exam.  Patient did have tenderness palpation of left chest wall and left thoracic back.  She reports this pain is not the same as she is experiencing.  No edema in lower extremities. negative Homans.  Negative cord sign.  Troponin is negative  Twelve-lead showed sinus rhythm rate of 63 no PVCs evidence of right bundle branch noted.  No T wave abnormalities present.  No previous ECG to compare.  Wells score for PE is 0 low likelihood of pulmonary embolism  Chemistry unremarkable no LFT elevation or electrolyte derangement noted CBC is acutely unremarkable  Chest x-ray did reveal a granuloma on the right upper lung with left basilar atelectasis noted.  No cardiomegaly or  mediastinal abnormalities noted  At this time low suspicion for ACS troponin negative and she does not have a prior cardiac history.  ECG does show evidence of a right bundle branch block however unable to compare to see if this is a new finding.  Vital signs remained normal throughout her stay.  Patient is already on multiple pain medications will advise her to continue with her daily regiment however her symptoms worsen she is advised to seek emergency evaluation at an actual emergency department.  Etiology of her chest discomfort is unclear however I strongly suspect to be associated with the travel and increased activity        No follow-ups on file.    The patient verbalizes agreement and understanding the plan    ROBERT Durant       A voice recognition program was used to aid in documentation of this record. Sometimes words are not printed exactly as they were spoken.  While efforts were made to carefully edit and correct any inaccuracies, some areas may be present; please take these into context.  Please contact the provider if areas are identified.

## 2023-07-12 PROCEDURE — 93010 ELECTROCARDIOGRAM REPORT: CPT | Performed by: INTERNAL MEDICINE

## 2023-08-02 DIAGNOSIS — J41.0 SIMPLE CHRONIC BRONCHITIS (HCC): ICD-10-CM

## 2023-08-02 DIAGNOSIS — J45.50 SEVERE PERSISTENT ASTHMA WITHOUT COMPLICATION: ICD-10-CM

## 2023-08-03 RX ORDER — TIOTROPIUM BROMIDE INHALATION SPRAY 3.12 UG/1
SPRAY, METERED RESPIRATORY (INHALATION)
Qty: 4 G | Refills: 0 | Status: SHIPPED | OUTPATIENT
Start: 2023-08-03

## 2023-08-03 NOTE — TELEPHONE ENCOUNTER
PUMA pt.  At this point she plans to follow Dr. Ivana Chan to [place]. I told her that we filled this 1 Rx but future refills will need to come from him. She verbalized understanding.

## 2023-08-03 NOTE — TELEPHONE ENCOUNTER
Spiriva signed, no refills. No future appointment. Needs to be scheduled with new provider or document following Dr. Raz Driver. Appointment due in October or sooner. 2.18

## 2023-08-29 ENCOUNTER — TRANSCRIBE ORDERS (OUTPATIENT)
Dept: ADMINISTRATIVE | Age: 58
End: 2023-08-29

## 2023-08-29 DIAGNOSIS — R10.32 ABDOMINAL PAIN, LEFT LOWER QUADRANT: Primary | ICD-10-CM

## 2023-09-01 ENCOUNTER — HOSPITAL ENCOUNTER (OUTPATIENT)
Dept: ULTRASOUND IMAGING | Age: 58
Discharge: HOME | End: 2023-09-01
Payer: MEDICAID

## 2023-09-01 DIAGNOSIS — R10.32 ABDOMINAL PAIN, LEFT LOWER QUADRANT: ICD-10-CM

## 2023-09-01 PROCEDURE — 76830 TRANSVAGINAL US NON-OB: CPT

## 2024-01-30 ENCOUNTER — HOSPITAL ENCOUNTER (OUTPATIENT)
Dept: MAMMOGRAPHY | Age: 59
Discharge: HOME | End: 2024-01-30
Payer: MEDICAID

## 2024-01-30 VITALS — BODY MASS INDEX: 23.86 KG/M2 | WEIGHT: 152 LBS | HEIGHT: 67 IN

## 2024-01-30 DIAGNOSIS — Z12.31 ENCOUNTER FOR SCREENING MAMMOGRAM FOR BREAST CANCER: ICD-10-CM

## 2024-01-30 PROCEDURE — 77063 BREAST TOMOSYNTHESIS BI: CPT

## 2024-05-06 RX ORDER — ASPIRIN 81 MG/1
TABLET ORAL
Qty: 90 TABLET | Refills: 0 | Status: SHIPPED | OUTPATIENT
Start: 2024-05-06

## 2024-05-06 NOTE — TELEPHONE ENCOUNTER
5/15/2023 [facility] [place]  5/17/2024 upcoming [facility] [place] appt  7/10/2023 cbc, cmp

## 2024-05-17 ENCOUNTER — OFFICE VISIT (OUTPATIENT)
Dept: CARDIOLOGY CLINIC | Age: 59
End: 2024-05-17
Payer: MEDICAID

## 2024-05-17 ENCOUNTER — TELEPHONE (OUTPATIENT)
Dept: CARDIOLOGY CLINIC | Age: 59
End: 2024-05-17

## 2024-05-17 VITALS
WEIGHT: 150 LBS | SYSTOLIC BLOOD PRESSURE: 114 MMHG | DIASTOLIC BLOOD PRESSURE: 6 MMHG | HEIGHT: 66 IN | BODY MASS INDEX: 24.11 KG/M2 | OXYGEN SATURATION: 96 % | HEART RATE: 79 BPM

## 2024-05-17 DIAGNOSIS — E78.2 MIXED HYPERLIPIDEMIA: ICD-10-CM

## 2024-05-17 DIAGNOSIS — I25.10 CORONARY ARTERY DISEASE INVOLVING NATIVE CORONARY ARTERY OF NATIVE HEART WITHOUT ANGINA PECTORIS: ICD-10-CM

## 2024-05-17 DIAGNOSIS — Z01.810 PREOP CARDIOVASCULAR EXAM: ICD-10-CM

## 2024-05-17 DIAGNOSIS — Z01.810 PREOPERATIVE CARDIOVASCULAR EXAMINATION: Primary | ICD-10-CM

## 2024-05-17 PROCEDURE — G8420 CALC BMI NORM PARAMETERS: HCPCS | Performed by: INTERNAL MEDICINE

## 2024-05-17 PROCEDURE — 1036F TOBACCO NON-USER: CPT | Performed by: INTERNAL MEDICINE

## 2024-05-17 PROCEDURE — 3017F COLORECTAL CA SCREEN DOC REV: CPT | Performed by: INTERNAL MEDICINE

## 2024-05-17 PROCEDURE — G8427 DOCREV CUR MEDS BY ELIG CLIN: HCPCS | Performed by: INTERNAL MEDICINE

## 2024-05-17 PROCEDURE — 99214 OFFICE O/P EST MOD 30 MIN: CPT | Performed by: INTERNAL MEDICINE

## 2024-05-17 PROCEDURE — 93000 ELECTROCARDIOGRAM COMPLETE: CPT | Performed by: INTERNAL MEDICINE

## 2024-05-17 RX ORDER — ZALEPLON 5 MG/1
5 CAPSULE ORAL NIGHTLY
COMMUNITY

## 2024-05-17 NOTE — PROGRESS NOTES
Ascorbic Acid (VITAMIN C) 250 MG tablet Take 4 tablets by mouth daily   Yes ProviderGege MD   estrogens, conjugated, (PREMARIN) 1.25 MG tablet Take by mouth   Yes Gege Pereira MD   morphine (AVINZA) 60 MG extended release capsule Take 1 capsule by mouth daily.   Yes ProviderGege MD   SUMAtriptan (IMITREX) 100 MG tablet Take 2 tablets by mouth once as needed   Yes Gege Pereira MD   SUMAtriptan (IMITREX) 5 MG/ACT nasal spray 1 spray by Nasal route daily as needed 20 mg nasal spray   Yes Provider, MD Gege   topiramate (TOPAMAX) 200 MG tablet Take 1 tablet by mouth daily 200 mg and 50 mg daily   Yes ProviderGege MD   levalbuterol (XOPENEX HFA) 45 MCG/ACT inhaler Inhale 2 puffs into the lungs every 6 hours as needed for Wheezing or Shortness of Breath 10/13/22 10/13/23  Rodney Olson MD        Allergies:  Latex, Acyclovir, Albuterol, Astemizole, Crestor [rosuvastatin], Food color pink, Iv dye [iodides], Other, Penicillins, Statins, Sulfa antibiotics, Tobramycin-dexamethasone, and Codeine     Review of Systems:   Constitutional: there has been no unanticipated weight loss. There's been no change in energy level, sleep pattern, or activity level.     Eyes: No visual changes or diplopia. No scleral icterus.  ENT: No Headaches, hearing loss or vertigo. No mouth sores or sore throat.  Cardiovascular: Reviewed in HPI  Respiratory: No cough or wheezing, no sputum production. No hematemesis.    Gastrointestinal: No abdominal pain, appetite loss, blood in stools. No change in bowel or bladder habits.  Genitourinary: No dysuria, trouble voiding, or hematuria.  Musculoskeletal:  No gait disturbance, weakness or joint complaints.  Integumentary: No rash or pruritis.  Neurological: No headache, diplopia, change in muscle strength, numbness or tingling. No change in gait, balance, coordination, mood, affect, memory, mentation, behavior.  Psychiatric: No anxiety, no

## 2024-05-17 NOTE — PATIENT INSTRUCTIONS
Plan:  ~Discussed Reptha therapy- we will start the prior authorization process after you have fasting lipids   ~Ok for surgery- letter provided     Cardiac medications reviewed including indications and pertinent side effects. Medication list updated at this visit.   Patient verbalizes understanding of the need for treatment and education has been provided at today's visit. Additional education material will be provided in after visit summary.    Check blood pressure and heart rate at home a few times per week- keep a log with dates and times and bring to office visit   Regular exercise and following a healthy diet encouraged   Follow up with me in 1 year

## 2024-06-16 PROBLEM — Z01.810 PREOP CARDIOVASCULAR EXAM: Status: RESOLVED | Noted: 2023-05-15 | Resolved: 2024-06-16

## 2024-08-06 RX ORDER — ASPIRIN 81 MG/1
TABLET ORAL
Qty: 90 TABLET | Refills: 2 | Status: SHIPPED | OUTPATIENT
Start: 2024-08-06

## 2025-02-03 ENCOUNTER — TRANSCRIBE ORDERS (OUTPATIENT)
Dept: ADMINISTRATIVE | Age: 60
End: 2025-02-03

## 2025-02-03 DIAGNOSIS — R92.8 ABNORMAL MAMMOGRAM: Primary | ICD-10-CM

## 2025-03-05 ENCOUNTER — HOSPITAL ENCOUNTER (OUTPATIENT)
Dept: MAMMOGRAPHY | Age: 60
Discharge: HOME | End: 2025-03-05
Payer: MEDICAID

## 2025-03-05 ENCOUNTER — HOSPITAL ENCOUNTER (OUTPATIENT)
Dept: ULTRASOUND IMAGING | Age: 60
Discharge: HOME | End: 2025-03-05
Payer: MEDICAID

## 2025-03-05 VITALS — WEIGHT: 139 LBS | HEIGHT: 66 IN | BODY MASS INDEX: 22.34 KG/M2

## 2025-03-05 DIAGNOSIS — R92.8 ABNORMAL MAMMOGRAM: ICD-10-CM

## 2025-03-05 PROCEDURE — 76642 ULTRASOUND BREAST LIMITED: CPT

## 2025-03-05 PROCEDURE — G0279 TOMOSYNTHESIS, MAMMO: HCPCS

## 2025-06-03 DIAGNOSIS — E78.2 MIXED HYPERLIPIDEMIA: Primary | ICD-10-CM

## 2025-06-04 NOTE — TELEPHONE ENCOUNTER
Last [place] Visit: 5/17/2024 [place] [facility] [place]? No    Next Office Visit: no Follow up with me in 1 year     LAST LABS:   Cbc 6/5/24 CE  Bmp 4/12/24 CE    Pt needs appt and updated labs. Labs ordered through [employer]. Left vm to call back.     Front can you reach pt and yogesh f/u appt and relay labs need completed

## 2025-06-05 RX ORDER — ASPIRIN 81 MG/1
81 TABLET, COATED ORAL DAILY
Qty: 90 TABLET | Refills: 1 | Status: SHIPPED | OUTPATIENT
Start: 2025-06-05

## 2025-06-05 NOTE — TELEPHONE ENCOUNTER
Pt is scheduled for 10/3 for next available at [place] per pt request. Advised pt of lab work and to get completed asap. She v/u.